# Patient Record
Sex: FEMALE | Race: WHITE | Employment: FULL TIME | URBAN - METROPOLITAN AREA
[De-identification: names, ages, dates, MRNs, and addresses within clinical notes are randomized per-mention and may not be internally consistent; named-entity substitution may affect disease eponyms.]

---

## 2018-06-14 ENCOUNTER — APPOINTMENT (OUTPATIENT)
Dept: ULTRASOUND IMAGING | Age: 28
End: 2018-06-14
Payer: COMMERCIAL

## 2018-06-14 ENCOUNTER — APPOINTMENT (OUTPATIENT)
Dept: CT IMAGING | Age: 28
End: 2018-06-14
Payer: COMMERCIAL

## 2018-06-14 ENCOUNTER — HOSPITAL ENCOUNTER (EMERGENCY)
Age: 28
Discharge: HOME | End: 2018-06-14
Attending: EMERGENCY MEDICINE
Payer: COMMERCIAL

## 2018-06-14 VITALS
RESPIRATION RATE: 18 BRPM | SYSTOLIC BLOOD PRESSURE: 104 MMHG | OXYGEN SATURATION: 99 % | TEMPERATURE: 98.4 F | HEIGHT: 64 IN | BODY MASS INDEX: 28.34 KG/M2 | HEART RATE: 77 BPM | DIASTOLIC BLOOD PRESSURE: 59 MMHG | WEIGHT: 166 LBS

## 2018-06-14 DIAGNOSIS — R10.11 RIGHT UPPER QUADRANT ABDOMINAL PAIN: Primary | ICD-10-CM

## 2018-06-14 LAB
ALBUMIN SERPL-MCNC: 4.2 G/DL (ref 3.5–5.2)
ALP BLD-CCNC: 57 U/L (ref 35–104)
ALT SERPL-CCNC: 14 U/L (ref 0–32)
ANION GAP SERPL CALCULATED.3IONS-SCNC: 13 MMOL/L (ref 7–16)
AST SERPL-CCNC: 19 U/L (ref 0–31)
BACTERIA: NORMAL /HPF
BASOPHILS ABSOLUTE: 0.07 E9/L (ref 0–0.2)
BASOPHILS RELATIVE PERCENT: 0.4 % (ref 0–2)
BILIRUB SERPL-MCNC: 0.4 MG/DL (ref 0–1.2)
BILIRUBIN URINE: NEGATIVE
BLOOD, URINE: ABNORMAL
BUN BLDV-MCNC: 8 MG/DL (ref 6–20)
CALCIUM SERPL-MCNC: 8.9 MG/DL (ref 8.6–10.2)
CHLORIDE BLD-SCNC: 101 MMOL/L (ref 98–107)
CHP ED QC CHECK: YES
CLARITY: CLEAR
CO2: 22 MMOL/L (ref 22–29)
COLOR: YELLOW
CREAT SERPL-MCNC: 0.8 MG/DL (ref 0.5–1)
EOSINOPHILS ABSOLUTE: 0.13 E9/L (ref 0.05–0.5)
EOSINOPHILS RELATIVE PERCENT: 0.8 % (ref 0–6)
GFR AFRICAN AMERICAN: >60
GFR NON-AFRICAN AMERICAN: >60 ML/MIN/1.73
GLUCOSE BLD-MCNC: 101 MG/DL (ref 74–109)
GLUCOSE URINE: NEGATIVE MG/DL
HCG(URINE) PREGNANCY TEST: NEGATIVE
HCT VFR BLD CALC: 40.9 % (ref 34–48)
HEMOGLOBIN: 14.4 G/DL (ref 11.5–15.5)
IMMATURE GRANULOCYTES #: 0.06 E9/L
IMMATURE GRANULOCYTES %: 0.4 % (ref 0–5)
KETONES, URINE: NEGATIVE MG/DL
LACTIC ACID: 0.9 MMOL/L (ref 0.5–2.2)
LEUKOCYTE ESTERASE, URINE: NEGATIVE
LYMPHOCYTES ABSOLUTE: 2.29 E9/L (ref 1.5–4)
LYMPHOCYTES RELATIVE PERCENT: 14.2 % (ref 20–42)
MCH RBC QN AUTO: 33 PG (ref 26–35)
MCHC RBC AUTO-ENTMCNC: 35.2 % (ref 32–34.5)
MCV RBC AUTO: 93.6 FL (ref 80–99.9)
MONOCYTES ABSOLUTE: 0.67 E9/L (ref 0.1–0.95)
MONOCYTES RELATIVE PERCENT: 4.2 % (ref 2–12)
NEUTROPHILS ABSOLUTE: 12.88 E9/L (ref 1.8–7.3)
NEUTROPHILS RELATIVE PERCENT: 80 % (ref 43–80)
NITRITE, URINE: NEGATIVE
PDW BLD-RTO: 11.9 FL (ref 11.5–15)
PH UA: 7 (ref 5–9)
PLATELET # BLD: 251 E9/L (ref 130–450)
PMV BLD AUTO: 9.9 FL (ref 7–12)
POTASSIUM SERPL-SCNC: 3.8 MMOL/L (ref 3.5–5)
PREGNANCY TEST URINE, POC: NEGATIVE
PROTEIN UA: NEGATIVE MG/DL
RBC # BLD: 4.37 E12/L (ref 3.5–5.5)
RBC UA: >20 /HPF (ref 0–2)
SODIUM BLD-SCNC: 136 MMOL/L (ref 132–146)
SPECIFIC GRAVITY UA: 1.01 (ref 1–1.03)
TOTAL PROTEIN: 7.1 G/DL (ref 6.4–8.3)
UROBILINOGEN, URINE: 0.2 E.U./DL
WBC # BLD: 16.1 E9/L (ref 4.5–11.5)
WBC UA: NORMAL /HPF (ref 0–5)

## 2018-06-14 PROCEDURE — 85025 COMPLETE CBC W/AUTO DIFF WBC: CPT

## 2018-06-14 PROCEDURE — 2580000003 HC RX 258: Performed by: PHYSICIAN ASSISTANT

## 2018-06-14 PROCEDURE — 76856 US EXAM PELVIC COMPLETE: CPT

## 2018-06-14 PROCEDURE — 74176 CT ABD & PELVIS W/O CONTRAST: CPT

## 2018-06-14 PROCEDURE — 81001 URINALYSIS AUTO W/SCOPE: CPT

## 2018-06-14 PROCEDURE — 81025 URINE PREGNANCY TEST: CPT

## 2018-06-14 PROCEDURE — 76705 ECHO EXAM OF ABDOMEN: CPT

## 2018-06-14 PROCEDURE — 93976 VASCULAR STUDY: CPT

## 2018-06-14 PROCEDURE — 83605 ASSAY OF LACTIC ACID: CPT

## 2018-06-14 PROCEDURE — 87088 URINE BACTERIA CULTURE: CPT

## 2018-06-14 PROCEDURE — 80053 COMPREHEN METABOLIC PANEL: CPT

## 2018-06-14 PROCEDURE — 99284 EMERGENCY DEPT VISIT MOD MDM: CPT

## 2018-06-14 RX ORDER — 0.9 % SODIUM CHLORIDE 0.9 %
1000 INTRAVENOUS SOLUTION INTRAVENOUS ONCE
Status: COMPLETED | OUTPATIENT
Start: 2018-06-14 | End: 2018-06-14

## 2018-06-14 RX ORDER — ONDANSETRON 4 MG/1
4 TABLET, ORALLY DISINTEGRATING ORAL EVERY 8 HOURS PRN
Qty: 24 TABLET | Refills: 0 | Status: SHIPPED | OUTPATIENT
Start: 2018-06-14 | End: 2019-10-28 | Stop reason: ALTCHOICE

## 2018-06-14 RX ORDER — TRAMADOL HYDROCHLORIDE 50 MG/1
50 TABLET ORAL EVERY 6 HOURS PRN
Qty: 20 TABLET | Refills: 0 | Status: SHIPPED | OUTPATIENT
Start: 2018-06-14 | End: 2018-06-19

## 2018-06-14 RX ADMIN — SODIUM CHLORIDE 1000 ML: 9 INJECTION, SOLUTION INTRAVENOUS at 12:43

## 2018-06-14 ASSESSMENT — PAIN SCALES - GENERAL: PAINLEVEL_OUTOF10: 8

## 2018-06-14 ASSESSMENT — PAIN DESCRIPTION - ORIENTATION: ORIENTATION: RIGHT

## 2018-06-14 ASSESSMENT — PAIN DESCRIPTION - PAIN TYPE: TYPE: ACUTE PAIN

## 2018-06-14 ASSESSMENT — PAIN DESCRIPTION - LOCATION: LOCATION: ABDOMEN;FLANK

## 2018-06-14 ASSESSMENT — PAIN DESCRIPTION - FREQUENCY: FREQUENCY: CONTINUOUS

## 2018-06-14 ASSESSMENT — PAIN DESCRIPTION - DESCRIPTORS: DESCRIPTORS: SHARP;BURNING

## 2018-06-14 ASSESSMENT — PAIN DESCRIPTION - ONSET: ONSET: SUDDEN

## 2018-06-16 LAB — URINE CULTURE, ROUTINE: NORMAL

## 2019-10-28 ENCOUNTER — OFFICE VISIT (OUTPATIENT)
Dept: FAMILY MEDICINE CLINIC | Age: 29
End: 2019-10-28

## 2019-10-28 VITALS
HEART RATE: 96 BPM | OXYGEN SATURATION: 98 % | BODY MASS INDEX: 30.32 KG/M2 | TEMPERATURE: 97.9 F | SYSTOLIC BLOOD PRESSURE: 118 MMHG | WEIGHT: 182 LBS | HEIGHT: 65 IN | DIASTOLIC BLOOD PRESSURE: 80 MMHG

## 2019-10-28 DIAGNOSIS — J20.9 ACUTE BRONCHITIS, UNSPECIFIED ORGANISM: ICD-10-CM

## 2019-10-28 DIAGNOSIS — H92.02 OTALGIA OF LEFT EAR: Primary | ICD-10-CM

## 2019-10-28 PROCEDURE — 99213 OFFICE O/P EST LOW 20 MIN: CPT | Performed by: PHYSICIAN ASSISTANT

## 2019-10-28 RX ORDER — CEFDINIR 300 MG/1
300 CAPSULE ORAL 2 TIMES DAILY
Qty: 20 CAPSULE | Refills: 0 | Status: SHIPPED | OUTPATIENT
Start: 2019-10-28 | End: 2019-11-07

## 2019-10-28 RX ORDER — NORETHINDRONE ACETATE AND ETHINYL ESTRADIOL AND FERROUS FUMARATE 1.5-30(21)
KIT ORAL
Refills: 12 | COMMUNITY
Start: 2019-10-09

## 2019-10-28 RX ORDER — METHYLPREDNISOLONE 4 MG/1
TABLET ORAL
Qty: 1 KIT | Refills: 0 | Status: SHIPPED
Start: 2019-10-28 | End: 2021-03-10

## 2021-02-09 ENCOUNTER — OFFICE VISIT (OUTPATIENT)
Dept: PRIMARY CARE CLINIC | Age: 31
End: 2021-02-09
Payer: MEDICAID

## 2021-02-09 VITALS
WEIGHT: 176 LBS | OXYGEN SATURATION: 95 % | HEART RATE: 86 BPM | RESPIRATION RATE: 16 BRPM | SYSTOLIC BLOOD PRESSURE: 118 MMHG | TEMPERATURE: 98.4 F | DIASTOLIC BLOOD PRESSURE: 74 MMHG | BODY MASS INDEX: 29.32 KG/M2 | HEIGHT: 65 IN

## 2021-02-09 DIAGNOSIS — R42 DIZZINESS: ICD-10-CM

## 2021-02-09 DIAGNOSIS — F17.200 TOBACCO DEPENDENCE: Primary | ICD-10-CM

## 2021-02-09 LAB
ALBUMIN SERPL-MCNC: 4.4 G/DL (ref 3.5–5.2)
ALP BLD-CCNC: 50 U/L (ref 35–104)
ALT SERPL-CCNC: 16 U/L (ref 0–32)
ANION GAP SERPL CALCULATED.3IONS-SCNC: 8 MMOL/L (ref 7–16)
AST SERPL-CCNC: 18 U/L (ref 0–31)
BASOPHILS ABSOLUTE: 0.04 E9/L (ref 0–0.2)
BASOPHILS RELATIVE PERCENT: 0.4 % (ref 0–2)
BILIRUB SERPL-MCNC: 0.4 MG/DL (ref 0–1.2)
BUN BLDV-MCNC: 10 MG/DL (ref 6–20)
CALCIUM SERPL-MCNC: 9 MG/DL (ref 8.6–10.2)
CHLORIDE BLD-SCNC: 104 MMOL/L (ref 98–107)
CO2: 24 MMOL/L (ref 22–29)
CREAT SERPL-MCNC: 0.8 MG/DL (ref 0.5–1)
EOSINOPHILS ABSOLUTE: 0.12 E9/L (ref 0.05–0.5)
EOSINOPHILS RELATIVE PERCENT: 1.1 % (ref 0–6)
GFR AFRICAN AMERICAN: >60
GFR NON-AFRICAN AMERICAN: >60 ML/MIN/1.73
GLUCOSE BLD-MCNC: 93 MG/DL (ref 74–99)
HCT VFR BLD CALC: 45.4 % (ref 34–48)
HEMOGLOBIN: 14.9 G/DL (ref 11.5–15.5)
IMMATURE GRANULOCYTES #: 0.04 E9/L
IMMATURE GRANULOCYTES %: 0.4 % (ref 0–5)
LYMPHOCYTES ABSOLUTE: 1.64 E9/L (ref 1.5–4)
LYMPHOCYTES RELATIVE PERCENT: 14.8 % (ref 20–42)
MCH RBC QN AUTO: 33.1 PG (ref 26–35)
MCHC RBC AUTO-ENTMCNC: 32.8 % (ref 32–34.5)
MCV RBC AUTO: 100.9 FL (ref 80–99.9)
MONOCYTES ABSOLUTE: 0.48 E9/L (ref 0.1–0.95)
MONOCYTES RELATIVE PERCENT: 4.3 % (ref 2–12)
NEUTROPHILS ABSOLUTE: 8.77 E9/L (ref 1.8–7.3)
NEUTROPHILS RELATIVE PERCENT: 79 % (ref 43–80)
PDW BLD-RTO: 11.9 FL (ref 11.5–15)
PLATELET # BLD: 277 E9/L (ref 130–450)
PMV BLD AUTO: 10.7 FL (ref 7–12)
POTASSIUM SERPL-SCNC: 4.2 MMOL/L (ref 3.5–5)
RBC # BLD: 4.5 E12/L (ref 3.5–5.5)
SODIUM BLD-SCNC: 136 MMOL/L (ref 132–146)
TOTAL PROTEIN: 7.1 G/DL (ref 6.4–8.3)
WBC # BLD: 11.1 E9/L (ref 4.5–11.5)

## 2021-02-09 PROCEDURE — 4004F PT TOBACCO SCREEN RCVD TLK: CPT | Performed by: FAMILY MEDICINE

## 2021-02-09 PROCEDURE — G8419 CALC BMI OUT NRM PARAM NOF/U: HCPCS | Performed by: FAMILY MEDICINE

## 2021-02-09 PROCEDURE — G8427 DOCREV CUR MEDS BY ELIG CLIN: HCPCS | Performed by: FAMILY MEDICINE

## 2021-02-09 PROCEDURE — 99203 OFFICE O/P NEW LOW 30 MIN: CPT | Performed by: FAMILY MEDICINE

## 2021-02-09 PROCEDURE — G8484 FLU IMMUNIZE NO ADMIN: HCPCS | Performed by: FAMILY MEDICINE

## 2021-02-09 RX ORDER — PHENOL 1.4 %
AEROSOL, SPRAY (ML) MUCOUS MEMBRANE
COMMUNITY

## 2021-02-09 RX ORDER — BUPROPION HYDROCHLORIDE 150 MG/1
150 TABLET, EXTENDED RELEASE ORAL 2 TIMES DAILY
Qty: 60 TABLET | Refills: 3 | Status: SHIPPED
Start: 2021-02-09 | End: 2021-03-10 | Stop reason: ALTCHOICE

## 2021-02-09 SDOH — ECONOMIC STABILITY: FOOD INSECURITY: WITHIN THE PAST 12 MONTHS, THE FOOD YOU BOUGHT JUST DIDN'T LAST AND YOU DIDN'T HAVE MONEY TO GET MORE.: NEVER TRUE

## 2021-02-09 SDOH — ECONOMIC STABILITY: INCOME INSECURITY: HOW HARD IS IT FOR YOU TO PAY FOR THE VERY BASICS LIKE FOOD, HOUSING, MEDICAL CARE, AND HEATING?: NOT HARD AT ALL

## 2021-02-09 SDOH — ECONOMIC STABILITY: TRANSPORTATION INSECURITY
IN THE PAST 12 MONTHS, HAS THE LACK OF TRANSPORTATION KEPT YOU FROM MEDICAL APPOINTMENTS OR FROM GETTING MEDICATIONS?: NOT ASKED

## 2021-02-09 SDOH — ECONOMIC STABILITY: TRANSPORTATION INSECURITY
IN THE PAST 12 MONTHS, HAS LACK OF TRANSPORTATION KEPT YOU FROM MEETINGS, WORK, OR FROM GETTING THINGS NEEDED FOR DAILY LIVING?: NOT ASKED

## 2021-02-09 ASSESSMENT — PATIENT HEALTH QUESTIONNAIRE - PHQ9
2. FEELING DOWN, DEPRESSED OR HOPELESS: 0
1. LITTLE INTEREST OR PLEASURE IN DOING THINGS: 0
DEPRESSION UNABLE TO ASSESS: FUNCTIONAL CAPACITY MOTIVATION LIMITS ACCURACY
SUM OF ALL RESPONSES TO PHQ9 QUESTIONS 1 & 2: 0
SUM OF ALL RESPONSES TO PHQ QUESTIONS 1-9: 0

## 2021-02-09 NOTE — PROGRESS NOTES
2021     Marcos Spain    : 1990 Sex: female   Age: 33 y.o. Chief Complaint   Patient presents with    New Patient       HPI: This 33y.o. -year-old female  presents today to establish care as a new patient. Past medical history includes polycystic ovarian syndrome. Social history is positive for tobacco dependence. The patient is willing to initiate a tobacco cessation program.  Family history is significant for hypertension and diabetes. Her father  at the age of 62 from a drug overdose. . Current medication list reviewed. The patient is tolerating all medications well without adverse events or known side effects. The patient does understand the risk and benefits of the prescribed medications. The patient is not up-to-date on all age-appropriate wellness issues. The patient presents today with a complaint of dizziness. The patient states she recently changed her diet and started exercising. The patient is only consuming 1200 rashard a day. ROS:   Const: Denies changes in appetite, chills, fever, night sweats and weight loss. Eyes:  Denies discharge, a recent change in visual acuity, blurred vision and double vision. ENMT: Denies discharge of the ears, hearing loss, pain of the ears. Denies nasal or sinus symptoms other than stated above. Denies mouth or throat symptoms. CV:  Denies chest pain, dyspnea on exertion, orthopnea, palpitations and PND  Resp: Denies chest pain, cough, SOB and wheezing. GI: Denies abdominal pain, constipation, diarrhea, heartburn, indigestion, nausea and vomiting. : Denies dysuria, frequency, hematuria, nocturia and urgency. Musculo: Denies arthralgias and myalgia  Skin:  Denies lesions, pruritus and rash. Neuro: Denies dizziness, lightheadedness, numbness, tingling and weakness.   Psych:  Denies anxiety and depression Endocrine: Denies polyuria, polydipsia, polyphagia, weight gain, dry skin, constipation, fatigue, cold intolerance, heat intolerance or tremors. Hema/Lymph: Denies hematologic symptoms  Allergy/Immuno:  Denies allergic/immunologic symptoms. Pertinent positives reviewed and noted      Current Outpatient Medications:     Multiple Vitamins-Minerals (MULTIVITAMIN WOMEN) TABS, Take by mouth, Disp: , Rfl:     buPROPion (WELLBUTRIN SR) 150 MG extended release tablet, Take 1 tablet by mouth 2 times daily, Disp: 60 tablet, Rfl: 3    JUNEL FE 1.5/30 1.5-30 MG-MCG tablet, TAKE ONE TABLET BY MOUTH  PER DAY, Disp: , Rfl: 12    methylPREDNISolone (MEDROL DOSEPACK) 4 MG tablet, Take by mouth.  (Patient not taking: Reported on 2/9/2021), Disp: 1 kit, Rfl: 0    Allergies   Allergen Reactions    Codeine Hives       Past Medical History:   Diagnosis Date    Asthma     PCOS (polycystic ovarian syndrome)      Social History     Socioeconomic History    Marital status: Single     Spouse name: Not on file    Number of children: Not on file    Years of education: Not on file    Highest education level: Not on file   Occupational History    Not on file   Social Needs    Financial resource strain: Not hard at all    Food insecurity     Worry: Never true     Inability: Never true   [employer] needs     Medical: Not on file     Non-medical: Not on file   Tobacco Use    Smoking status: Current Some Day Smoker     Types: Cigarettes    Smokeless tobacco: Never Used   Substance and Sexual Activity    Alcohol use: No    Drug use: No    Sexual activity: Not on file   Lifestyle    Physical activity     Days per week: Not on file     Minutes per session: Not on file    Stress: Not on file   Relationships    Social connections     Talks on phone: Not on file     Gets together: Not on file     Attends Religion service: Not on file     Active member of club or organization: Not on file Attends meetings of clubs or organizations: Not on file     Relationship status: Not on file    Intimate partner violence     Fear of current or ex partner: Not on file     Emotionally abused: Not on file     Physically abused: Not on file     Forced sexual activity: Not on file   Other Topics Concern    Not on file   Social History Narrative    Not on file     Past Surgical History:   Procedure Laterality Date    [facility]      right      History reviewed. No pertinent family history. Vitals:    02/09/21 0748   BP: 118/74   Pulse: 86   Resp: 16   Temp: 98.4 °F (36.9 °C)   TempSrc: Temporal   SpO2: 95%   Weight: 176 lb (79.8 kg)   Height: 5' 5\" (1.651 m)        Exam: Const: Appears healthy and well developed. No signs of acute distress present. Eyes: PERRL  ENMT: Tympanic membranes are intact. Nasal mucosa intact without noted erythema Septum is in the midline. Posterior pharynx shows no exudate, irritation or redness. Neck:  Supple without adenopathy. Adequate range of motion   Resp: No rales, rhonchi, wheezes appreciated over the lungs bilaterally. CV: S1, S2 within normal limits. Regular rate and rhythm noted. Without murmur, gallop or rub. Extremities:  Pulses intact. Without noted edema. Abdomen: Positive bowel sounds. Palpation reveals softness, with no distension, organomegaly or tenderness. No abdominal masses palpable. Skin: Skin is warm and dry. Musculo: Unremarkable upon examination. Neuro: Alert and oriented X3. Cranial nerves grossly intact. Psych: Mood is normal.  Affect is normal.   Vital signs reviewed. Controlled Substances Monitoring:     No flowsheet data found. Plan Per Assessment:  Daphnie Rojas was seen today for new patient. Diagnoses and all orders for this visit:    Tobacco dependence    Dizziness  -     CBC Auto Differential; Future  -     Comprehensive Metabolic Panel;  Future    Other orders -     buPROPion (WELLBUTRIN SR) 150 MG extended release tablet; Take 1 tablet by mouth 2 times daily      The patient was advised to increase her caloric intake to 1800 rashard a day. Return in about 4 weeks (around 3/9/2021) for Erin Guillen. Johnna Moon MD    Note was generated with the assistance of voice recognition software. Document was reviewed however may contain grammatical errors.

## 2021-03-10 ENCOUNTER — OFFICE VISIT (OUTPATIENT)
Dept: PRIMARY CARE CLINIC | Age: 31
End: 2021-03-10
Payer: MEDICAID

## 2021-03-10 VITALS
BODY MASS INDEX: 28.66 KG/M2 | DIASTOLIC BLOOD PRESSURE: 60 MMHG | RESPIRATION RATE: 16 BRPM | WEIGHT: 172 LBS | SYSTOLIC BLOOD PRESSURE: 126 MMHG | HEIGHT: 65 IN

## 2021-03-10 DIAGNOSIS — F17.200 TOBACCO DEPENDENCE: Primary | ICD-10-CM

## 2021-03-10 PROCEDURE — G8419 CALC BMI OUT NRM PARAM NOF/U: HCPCS | Performed by: FAMILY MEDICINE

## 2021-03-10 PROCEDURE — G8427 DOCREV CUR MEDS BY ELIG CLIN: HCPCS | Performed by: FAMILY MEDICINE

## 2021-03-10 PROCEDURE — G8484 FLU IMMUNIZE NO ADMIN: HCPCS | Performed by: FAMILY MEDICINE

## 2021-03-10 PROCEDURE — 4004F PT TOBACCO SCREEN RCVD TLK: CPT | Performed by: FAMILY MEDICINE

## 2021-03-10 PROCEDURE — 99213 OFFICE O/P EST LOW 20 MIN: CPT | Performed by: FAMILY MEDICINE

## 2021-03-10 RX ORDER — VARENICLINE TARTRATE
KIT
Qty: 1 BOX | Refills: 0 | Status: SHIPPED
Start: 2021-03-10 | End: 2021-06-01

## 2021-03-10 NOTE — PROGRESS NOTES
3/10/2021     Flori Spain    : 1990 Sex: female   Age: 33 y.o. Chief Complaint   Patient presents with    Other     Tobacco cessation. HPI: This 33y.o. -year-old female  presents today for evaluation and management of her  chronic medical problems. At her last office visit the patient was started on Wellbutrin for tobacco cessation. Current medication list reviewed the patient states she stopped the Wellbutrin after about 1 weeks worth of therapy due to a side effect of nausea. The patient states she has cut back on the number of cigarettes smoked daily. . The patient is not up-to-date on all age-appropriate wellness issues. ROS:   Const: Denies changes in appetite, chills, fever, night sweats and weight loss. Eyes:  Denies discharge, a recent change in visual acuity, blurred vision and double vision. ENMT: Denies discharge of the ears, hearing loss, pain of the ears. Denies nasal or sinus symptoms other than stated above. Denies mouth or throat symptoms. CV:  Denies chest pain, dyspnea on exertion, orthopnea, palpitations and PND  Resp: Denies chest pain, cough, SOB and wheezing. GI: Denies abdominal pain, constipation, diarrhea, heartburn, indigestion, nausea and vomiting. : Denies dysuria, frequency, hematuria, nocturia and urgency. Musculo: Denies arthralgias and myalgia  Skin:  Denies lesions, pruritus and rash. Neuro: Denies dizziness, lightheadedness, numbness, tingling and weakness. Psych:  Denies anxiety and depression  Endocrine: Denies polyuria, polydipsia, polyphagia, weight gain, dry skin, constipation, fatigue, cold intolerance, heat intolerance or tremors. Hema/Lymph: Denies hematologic symptoms  Allergy/Immuno:  Denies allergic/immunologic symptoms.   Pertinent positives reviewed and noted      Current Outpatient Medications:     varenicline (CHANTIX STARTING MONTH ) 0.5 MG X 11 & 1 MG X 42 tablet, Take by mouth., Disp: 1 box, Rfl: 0    Multiple Exam: Const: Appears healthy and well developed. No signs of acute distress present. Eyes: PERRL  ENMT: Tympanic membranes are intact. Nasal mucosa intact without noted erythema Septum is in the midline. Posterior pharynx shows no exudate, irritation or redness. Neck:  Supple without adenopathy. Adequate range of motion   Resp: No rales, rhonchi, wheezes appreciated over the lungs bilaterally. CV: S1, S2 within normal limits. Regular rate and rhythm noted. Without murmur, gallop or rub. Extremities:  Pulses intact. Without noted edema. Abdomen: Positive bowel sounds. Palpation reveals softness, with no distension, organomegaly or tenderness. No abdominal masses palpable. Skin: Skin is warm and dry. Musculo: Unremarkable upon examination. Neuro: Alert and oriented X3. Cranial nerves grossly intact. Psych: Mood is normal.  Affect is normal.   Vital signs reviewed. Controlled Substances Monitoring:     No flowsheet data found. Plan Per Assessment:  Cinthia Grace was seen today for other. Diagnoses and all orders for this visit:    Tobacco dependence    Other orders  -     varenicline (CHANTIX STARTING MONTH PAK) 0.5 MG X 11 & 1 MG X 42 tablet; Take by mouth. Return in about 4 weeks (around 4/7/2021) for Erin Guillen. Felix Mckinley MD    Note was generated with the assistance of voice recognition software. Document was reviewed however may contain grammatical errors.

## 2021-06-01 ENCOUNTER — OFFICE VISIT (OUTPATIENT)
Dept: FAMILY MEDICINE CLINIC | Age: 31
End: 2021-06-01
Payer: MEDICAID

## 2021-06-01 VITALS
WEIGHT: 178 LBS | TEMPERATURE: 97.8 F | BODY MASS INDEX: 29.62 KG/M2 | HEART RATE: 91 BPM | SYSTOLIC BLOOD PRESSURE: 122 MMHG | DIASTOLIC BLOOD PRESSURE: 80 MMHG | OXYGEN SATURATION: 97 %

## 2021-06-01 DIAGNOSIS — H66.003 NON-RECURRENT ACUTE SUPPURATIVE OTITIS MEDIA OF BOTH EARS WITHOUT SPONTANEOUS RUPTURE OF TYMPANIC MEMBRANES: Primary | ICD-10-CM

## 2021-06-01 PROCEDURE — G8419 CALC BMI OUT NRM PARAM NOF/U: HCPCS | Performed by: NURSE PRACTITIONER

## 2021-06-01 PROCEDURE — 4004F PT TOBACCO SCREEN RCVD TLK: CPT | Performed by: NURSE PRACTITIONER

## 2021-06-01 PROCEDURE — 99213 OFFICE O/P EST LOW 20 MIN: CPT | Performed by: NURSE PRACTITIONER

## 2021-06-01 PROCEDURE — G8427 DOCREV CUR MEDS BY ELIG CLIN: HCPCS | Performed by: NURSE PRACTITIONER

## 2021-06-01 RX ORDER — AMOXICILLIN 875 MG/1
875 TABLET, COATED ORAL 2 TIMES DAILY
Qty: 14 TABLET | Refills: 0 | Status: SHIPPED | OUTPATIENT
Start: 2021-06-01 | End: 2021-06-08

## 2021-06-01 RX ORDER — CETIRIZINE HYDROCHLORIDE, PSEUDOEPHEDRINE HYDROCHLORIDE 5; 120 MG/1; MG/1
1 TABLET, FILM COATED, EXTENDED RELEASE ORAL 2 TIMES DAILY
Qty: 60 TABLET | Refills: 0 | Status: SHIPPED | OUTPATIENT
Start: 2021-06-01

## 2021-06-01 RX ORDER — METHYLPREDNISOLONE 4 MG/1
TABLET ORAL
Qty: 1 KIT | Refills: 0 | Status: SHIPPED | OUTPATIENT
Start: 2021-06-01 | End: 2021-06-07

## 2021-06-01 NOTE — LETTER
[address]  Phone: [phone]  Fax: [phone]    DELANEY Nath NP        June 1, 2021      Yazan Kahn was seen in the clinic today. She may return to work on 6/2/2021. If you have any questions please do not hesitate to call. Thank you.       Sincerely,        EDLANEY Nath NP

## 2021-06-01 NOTE — PROGRESS NOTES
Chief Complaint:   Otalgia (bilateral ) and Sinus Problem (saturday )      History of Present Illness   Source of history provided by:  patient. Aleah Mai is a 33 y.o. old female presenting to the [facility] for evaluation of bilateral ear pain and congestion x 3 days. Reports associated nasal congestion, rhinorrhea, and sore throat. Denies any discharge from the ear canal. Has tried taking sinus medication OTC without relief. Denies any fever, chills, CP, SOB, abdominal pain, neck stiffness, rash, or lethargy. Review of Systems   Unless otherwise stated in this report or unable to obtain because of the patient's clinical or mental status as evidenced by the medical record, this patients's positive and negative responses for Review of Systems, constitutional, psych, eyes, ENT, cardiovascular, respiratory, gastrointestinal, neurological, genitourinary, musculoskeletal, integument systems and systems related to the presenting problem are either stated in the preceding or were not pertinent or were negative for the symptoms and/or complaints related to the medical problem. Past Medical History:  has a past medical history of Asthma and PCOS (polycystic ovarian syndrome). Past Surgical History:  has a past surgical history that includes Leg Surgery and Breast surgery. Social History:  reports that she has been smoking cigarettes. She has never used smokeless tobacco. She reports that she does not drink alcohol and does not use drugs. Family History: family history is not on file. Allergies: Codeine    Physical Exam   Vital Signs:   Vitals:    06/01/21 0828   BP: 122/80   Pulse: 91   Temp: 97.8 °F (36.6 °C)   TempSrc: Temporal   SpO2: 97%   Weight: 178 lb (80.7 kg)     Oxygen Saturation Interpretation: Normal.    Constitutional:  Alert, development consistent with age. Ears:  External Ears: Normal pinna bilaterally.                  TM's & External Canals: Bilateral external auditory ear canals are clear there is no cerumen, debris or erythema present. Bilateral tympanic membranes intact, bulging and erythematous. Nose: Mild congestion of the nasal mucosa. Throat:  Posterior pharynx without injection, exudate, or tonsillar hypertrophy. Airway patient. Neck:  Normal ROM. Supple. No adenopathy. Respiratory:  CTAB without wheezing, rales, or rhonchi  CV: Regular rate and rhythm, normal heart sounds, without pathological murmurs, ectopy, gallops, or rubs. Skin:  Moist and warm without rashes or lesions. Lymphatic: No lymphangitis or adenopathy noted. Neurological:  Oriented. Motor functions intact. Test Results Section   (All laboratory and radiology results have been personally reviewed by myself)  Labs:  No results found for this visit on 06/01/21. Assessment / Plan     Impression(s):  Jaye Christy was seen today for otalgia and sinus problem. Diagnoses and all orders for this visit:    Non-recurrent acute suppurative otitis media of both ears without spontaneous rupture of tympanic membranes  -     amoxicillin (AMOXIL) 875 MG tablet; Take 1 tablet by mouth 2 times daily for 7 days  -     cetirizine-psuedoephedrine (ZYRTEC-D) 5-120 MG per extended release tablet; Take 1 tablet by mouth 2 times daily X 3 days, then as needed  -     methylPREDNISolone (MEDROL DOSEPACK) 4 MG tablet; Take by mouth. Script written for amoxicillin, Zyrtec-D and Medrol Dosepak, administration instructions and side effects discussed. Increase fluids and rest. Additional symptomatic relief discussed. F/u PCP in 5-7 days if symptoms persist. ED sooner if symptoms worsen or change. ED immediately with fever, worsening ear pain, CP, dyspnea, or dysphagia. Pt is in agreement with this care plan. All questions answered. No follow-ups on file.     DELANEY Ambriz - CORY

## 2021-10-13 ENCOUNTER — OFFICE VISIT (OUTPATIENT)
Dept: FAMILY MEDICINE CLINIC | Age: 31
End: 2021-10-13
Payer: MEDICAID

## 2021-10-13 VITALS
DIASTOLIC BLOOD PRESSURE: 82 MMHG | SYSTOLIC BLOOD PRESSURE: 140 MMHG | BODY MASS INDEX: 30.29 KG/M2 | OXYGEN SATURATION: 100 % | WEIGHT: 182 LBS | HEART RATE: 78 BPM | TEMPERATURE: 97.7 F

## 2021-10-13 DIAGNOSIS — J02.0 ACUTE STREPTOCOCCAL PHARYNGITIS: ICD-10-CM

## 2021-10-13 DIAGNOSIS — R07.0 PAIN IN THROAT: Primary | ICD-10-CM

## 2021-10-13 DIAGNOSIS — J02.9 SORE THROAT: ICD-10-CM

## 2021-10-13 LAB — S PYO AG THROAT QL: POSITIVE

## 2021-10-13 PROCEDURE — G8484 FLU IMMUNIZE NO ADMIN: HCPCS | Performed by: PHYSICIAN ASSISTANT

## 2021-10-13 PROCEDURE — G8427 DOCREV CUR MEDS BY ELIG CLIN: HCPCS | Performed by: PHYSICIAN ASSISTANT

## 2021-10-13 PROCEDURE — 87880 STREP A ASSAY W/OPTIC: CPT | Performed by: PHYSICIAN ASSISTANT

## 2021-10-13 PROCEDURE — 4004F PT TOBACCO SCREEN RCVD TLK: CPT | Performed by: PHYSICIAN ASSISTANT

## 2021-10-13 PROCEDURE — 99213 OFFICE O/P EST LOW 20 MIN: CPT | Performed by: PHYSICIAN ASSISTANT

## 2021-10-13 PROCEDURE — G8417 CALC BMI ABV UP PARAM F/U: HCPCS | Performed by: PHYSICIAN ASSISTANT

## 2021-10-13 RX ORDER — AMOXICILLIN AND CLAVULANATE POTASSIUM 875; 125 MG/1; MG/1
1 TABLET, FILM COATED ORAL 2 TIMES DAILY
Qty: 20 TABLET | Refills: 0 | Status: SHIPPED | OUTPATIENT
Start: 2021-10-13 | End: 2021-10-23

## 2021-10-13 ASSESSMENT — ENCOUNTER SYMPTOMS
PHOTOPHOBIA: 0
DIARRHEA: 0
COUGH: 0
VOMITING: 0
NAUSEA: 0
SORE THROAT: 1
SHORTNESS OF BREATH: 0
ABDOMINAL PAIN: 0
BACK PAIN: 0

## 2021-10-13 NOTE — LETTER
[address]  Phone: [phone]  Fax: [phone] [facility], [address]        October 13, 2021     Patient: Renée Zhang   YOB: 1990   Date of Visit: 10/13/2021       To Whom it May Concern:    Blanca Hoffman was seen in my clinic on 10/13/2021. She may return to work on 10/15/2021. If you have any questions or concerns, please don't hesitate to call.     Sincerely,         MORGAN GUTHRIE

## 2021-10-13 NOTE — PROGRESS NOTES
10/13/21  Caty Spain : 1990 Sex: female  Age 33 y.o. Subjective:  Chief Complaint   Patient presents with    Otalgia     bilateral     Pharyngitis         27-year-old female with negative history presents to the walk in clinic for evaluation of earache that started Saturday and a sore throat that started Monday night. She denies any associated cough or congestion. No shortness of breath or chest pain. No fever or chills. No nausea or vomiting. She is not taking any over-the-counter medications. She is currently on her menses and denies chance of pregnancy. She denies any difficulty swallowing. Review of Systems   Constitutional: Negative for chills and fever. HENT: Positive for ear pain and sore throat. Negative for congestion. Eyes: Negative for photophobia and visual disturbance. Respiratory: Negative for cough and shortness of breath. Cardiovascular: Negative for chest pain. Gastrointestinal: Negative for abdominal pain, diarrhea, nausea and vomiting. Genitourinary: Negative for difficulty urinating, dysuria, frequency and urgency. Musculoskeletal: Negative for back pain, neck pain and neck stiffness. Skin: Negative for rash. Neurological: Negative for dizziness, syncope, weakness, light-headedness and headaches. Hematological: Negative for adenopathy. Does not bruise/bleed easily. Psychiatric/Behavioral: Negative for agitation and confusion. All other systems reviewed and are negative. PMH:     Past Medical History:   Diagnosis Date    Asthma     PCOS (polycystic ovarian syndrome)        Past Surgical History:   Procedure Laterality Date    BREAST SURGERY      LEG SURGERY      right       History reviewed. No pertinent family history.     Medications:     Current Outpatient Medications:     amoxicillin-clavulanate (AUGMENTIN) 875-125 MG per tablet, Take 1 tablet by mouth 2 times daily for 10 days, Disp: 20 tablet, Rfl: 0   cetirizine-psuedoephedrine (ZYRTEC-D) 5-120 MG per extended release tablet, Take 1 tablet by mouth 2 times daily X 3 days, then as needed, Disp: 60 tablet, Rfl: 0    Multiple Vitamins-Minerals (MULTIVITAMIN WOMEN) TABS, Take by mouth, Disp: , Rfl:     JUNEL FE 1.5/30 1.5-30 MG-MCG tablet, TAKE ONE TABLET BY MOUTH  PER DAY, Disp: , Rfl: 12    Allergies: Allergies   Allergen Reactions    Codeine Hives       Social History:     Social History     Tobacco Use    Smoking status: Current Some Day Smoker     Types: Cigarettes    Smokeless tobacco: Never Used   Substance Use Topics    Alcohol use: No    Drug use: No       Patient lives at home. Physical Exam:     Vitals:    10/13/21 1542 10/13/21 1545   BP: (!) 140/82 (!) 140/82   Pulse: 78    Temp: 97.7 °F (36.5 °C)    TempSrc: Temporal    SpO2: 100%    Weight: 182 lb (82.6 kg)        Exam:  Physical Exam  Vitals and nursing note reviewed. Constitutional:       General: She is not in acute distress. Appearance: She is well-developed. HENT:      Head: Normocephalic and atraumatic. Right Ear: Tympanic membrane normal.      Left Ear: Tympanic membrane normal.      Nose: Nose normal.      Mouth/Throat:      Mouth: Mucous membranes are moist.      Comments: Uvula is midline. No trismus or drooling. Patient does have erythema noted to the posterior oropharynx without tonsillar hypertrophy or exudate. No peritonsillar abscess. Eyes:      Conjunctiva/sclera: Conjunctivae normal.      Pupils: Pupils are equal, round, and reactive to light. Cardiovascular:      Rate and Rhythm: Normal rate and regular rhythm. Pulmonary:      Effort: Pulmonary effort is normal. No respiratory distress. Breath sounds: Normal breath sounds. Abdominal:      General: Bowel sounds are normal.      Palpations: Abdomen is soft. Tenderness: There is no abdominal tenderness. Musculoskeletal:         General: Normal range of motion.       Cervical back: Normal range of motion. No rigidity. Lymphadenopathy:      Cervical: No cervical adenopathy. Skin:     General: Skin is warm and dry. Neurological:      General: No focal deficit present. Mental Status: She is alert and oriented to person, place, and time. Psychiatric:         Mood and Affect: Mood normal.         Behavior: Behavior normal.         Thought Content: Thought content normal.         Judgment: Judgment normal.           Testing:           Medical Decision Making:     Patient upon arrival did not appear toxic or lethargic. Vital signs were reviewed. Past medical history reviewed. Allergies reviewed. Medications reviewed. Patient is presenting with the above complaint of sore throat. Rapid strep test is positive. Patient will be given a prescription for Augmentin. She may use Tylenol Motrin as needed for discomfort. She can gargle with warm salt water. She is to maintain good oral intake. She is to go to the emergency department if she develops any trismus or drooling. Patient understands the plan is agreeable. She will follow up with her PCP as needed. Clinical Impression:   [address] [place] [address],[address] was seen today for otalgia and pharyngitis. Diagnoses and all orders for this visit:    Pain in throat  -     POCT rapid strep A    Sore throat    Acute streptococcal pharyngitis    Other orders  -     amoxicillin-clavulanate (AUGMENTIN) 875-125 MG per tablet; Take 1 tablet by mouth 2 times daily for 10 days        The patient is to call for any concerns or return if any of the signs or symptoms worsen. The patient is to follow-up with PCP in the next 2-3 days for repeat evaluation repeat assessment or go directly to the emergency department. SIGNATURE: Jacqueline Zurita PA-C

## 2022-01-20 ENCOUNTER — OFFICE VISIT (OUTPATIENT)
Dept: FAMILY MEDICINE CLINIC | Age: 32
End: 2022-01-20
Payer: MEDICAID

## 2022-01-20 VITALS
WEIGHT: 182 LBS | HEART RATE: 98 BPM | SYSTOLIC BLOOD PRESSURE: 136 MMHG | RESPIRATION RATE: 18 BRPM | BODY MASS INDEX: 30.32 KG/M2 | HEIGHT: 65 IN | TEMPERATURE: 97.8 F | OXYGEN SATURATION: 100 % | DIASTOLIC BLOOD PRESSURE: 80 MMHG

## 2022-01-20 DIAGNOSIS — Z20.822 ENCOUNTER FOR LABORATORY TESTING FOR COVID-19 VIRUS: ICD-10-CM

## 2022-01-20 DIAGNOSIS — J01.40 ACUTE NON-RECURRENT PANSINUSITIS: Primary | ICD-10-CM

## 2022-01-20 LAB
INFLUENZA A ANTIGEN, POC: NORMAL
INFLUENZA B ANTIGEN, POC: NORMAL
Lab: NORMAL
PERFORMING INSTRUMENT: NORMAL
QC PASS/FAIL: NORMAL
SARS-COV-2, POC: NORMAL

## 2022-01-20 PROCEDURE — 99213 OFFICE O/P EST LOW 20 MIN: CPT | Performed by: PHYSICIAN ASSISTANT

## 2022-01-20 PROCEDURE — 87426 SARSCOV CORONAVIRUS AG IA: CPT | Performed by: PHYSICIAN ASSISTANT

## 2022-01-20 PROCEDURE — G8417 CALC BMI ABV UP PARAM F/U: HCPCS | Performed by: PHYSICIAN ASSISTANT

## 2022-01-20 PROCEDURE — G8427 DOCREV CUR MEDS BY ELIG CLIN: HCPCS | Performed by: PHYSICIAN ASSISTANT

## 2022-01-20 PROCEDURE — 87804 INFLUENZA ASSAY W/OPTIC: CPT | Performed by: PHYSICIAN ASSISTANT

## 2022-01-20 PROCEDURE — G8484 FLU IMMUNIZE NO ADMIN: HCPCS | Performed by: PHYSICIAN ASSISTANT

## 2022-01-20 PROCEDURE — 4004F PT TOBACCO SCREEN RCVD TLK: CPT | Performed by: PHYSICIAN ASSISTANT

## 2022-01-20 RX ORDER — CEFDINIR 300 MG/1
300 CAPSULE ORAL 2 TIMES DAILY
Qty: 20 CAPSULE | Refills: 0 | Status: SHIPPED | OUTPATIENT
Start: 2022-01-20 | End: 2022-01-30

## 2022-01-20 NOTE — PROGRESS NOTES
Chief Complaint       Pharyngitis (x 4 days), Headache (no exposures), Otalgia (L ear), and Fever (high of 101.8)      History of Present Illness   Source of history provided by:  patient. Aleah Mai is a 33 y.o. old female presenting to the walk in clinic for evaluation of sore throat, sinus headache, left ear pain, and nasal congestion for the past 4 to 5 days. Patient does report a fever (high of 101.8) at the start of her illness which has since resolved. Denies any loss of taste or smell, CP, dyspnea, LE edema, abdominal pain, vomiting, rash, or lethargy. Patient does have a history of asthma but has not had to use her rescue inhaler during this acute illness. Patient denies recent sick exposures. ROS    Unless otherwise stated in this report or unable to obtain because of the patient's clinical or mental status as evidenced by the medical record, this patients's positive and negative responses for Review of Systems, constitutional, psych, eyes, ENT, cardiovascular, respiratory, gastrointestinal, neurological, genitourinary, musculoskeletal, integument systems and systems related to the presenting problem are either stated in the preceding or were not pertinent or were negative for the symptoms and/or complaints related to the medical problem. Past Medical History:  has a past medical history of Asthma and PCOS (polycystic ovarian syndrome). Past Surgical History:  has a past surgical history that includes Leg Surgery and Breast surgery. Social History:  reports that she has been smoking cigarettes. She has never used smokeless tobacco. She reports that she does not drink alcohol and does not use drugs. Family History: family history is not on file.    Allergies: Codeine    Physical Exam         VS:  /80   Pulse 98   Temp 97.8 °F (36.6 °C) (Temporal)   Resp 18   Ht 5' 5\" (1.651 m)   Wt 182 lb (82.6 kg)   SpO2 100%   BMI 30.29 kg/m²    Oxygen Saturation Interpretation: Normal.    Constitutional:  Alert, development consistent with age. NAD. Head:  NC/NT. Airway patent. Moderate TTP noted over the bilateral ethmoid and maxillary sinuses. Left TM dull with small serous effusion noted. Right TM within normal limits. Canals without swelling or exudate bilaterally. Mouth: Posterior pharynx with mild erythema and clear postnasal drip. No tonsillar hypertrophy or exudate. Neck:  Normal ROM. Supple. No anterior cervical adenopathy noted. Lungs: CTAB without wheezes, rales, or rhonchi. CV:  Regular rate and rhythm, normal heart sounds, without pathological murmurs, ectopy, gallops, or rubs. Skin:  Normal turgor. Warm, dry, without visible rash. Lymphatic: No lymphangitis or adenopathy noted. Neurological:  Oriented. Motor functions intact. Lab / Imaging Results   (All laboratory and radiology results have been personally reviewed by myself)  Labs:  Results for orders placed or performed in visit on 01/20/22   POCT COVID-19, Antigen   Result Value Ref Range    SARS-COV-2, POC Not-Detected Not Detected    Lot Number [id number]     QC Pass/Fail pass     Performing Instrument BD Veritor    POCT Influenza A/B Antigen   Result Value Ref Range    Inflenza A Ag neg     Influenza B Ag neg        Imaging: All Radiology results interpreted by Radiologist unless otherwise noted. Assessment / Plan     Impression(s):  María Chavez was seen today for pharyngitis, headache, otalgia and fever. Diagnoses and all orders for this visit:    Acute non-recurrent pansinusitis  -     POCT Influenza A/B Antigen    Encounter for laboratory testing for COVID-19 virus  -     POCT COVID-19, Antigen  -     COVID-19 Ambulatory; Future    Other orders  -     cefdinir (OMNICEF) 300 MG capsule; Take 1 capsule by mouth 2 times daily for 10 days      Disposition:  Disposition: Discharge to home. Rapid COVID-19 and rapid influenza testing are both negative in office.   Confirmatory COVID-19 PCR swab obtained and pending, will call with results once available. Advised cautionary self-quarantine at home in the interim. We will treat for suspected early sinusitis today with a course of Omnicef, side effects discussed. Increase fluids and rest. Symptomatic relief discussed including Tylenol prn pain/fever. Schedule virtual f/u with PCP in 7-10 days if symptoms persist. ED sooner if symptoms worsen or change. ED immediately with high or refractory fever, progressive SOB, dyspnea, CP, calf pain/swelling, shaking chills, vomiting, abdominal pain, lethargy, flank pain, or decreased urinary output. Pt verbalizes understanding and is in agreement with plan of care. All questions answered. Alphonse Knight PA-C    **This report was transcribed using voice recognition software. Every effort was made to ensure accuracy; however, inadvertent computerized transcription errors may be present.

## 2022-01-21 LAB — SARS-COV-2, PCR: NOT DETECTED

## 2022-10-11 ENCOUNTER — OFFICE VISIT (OUTPATIENT)
Dept: FAMILY MEDICINE CLINIC | Age: 32
End: 2022-10-11
Payer: MEDICAID

## 2022-10-11 VITALS
OXYGEN SATURATION: 98 % | HEIGHT: 65 IN | TEMPERATURE: 98 F | WEIGHT: 191 LBS | SYSTOLIC BLOOD PRESSURE: 132 MMHG | DIASTOLIC BLOOD PRESSURE: 86 MMHG | HEART RATE: 92 BPM | BODY MASS INDEX: 31.82 KG/M2 | RESPIRATION RATE: 18 BRPM

## 2022-10-11 DIAGNOSIS — J01.90 ACUTE NON-RECURRENT SINUSITIS, UNSPECIFIED LOCATION: Primary | ICD-10-CM

## 2022-10-11 PROCEDURE — G8417 CALC BMI ABV UP PARAM F/U: HCPCS | Performed by: PHYSICIAN ASSISTANT

## 2022-10-11 PROCEDURE — 99213 OFFICE O/P EST LOW 20 MIN: CPT | Performed by: PHYSICIAN ASSISTANT

## 2022-10-11 PROCEDURE — G8427 DOCREV CUR MEDS BY ELIG CLIN: HCPCS | Performed by: PHYSICIAN ASSISTANT

## 2022-10-11 PROCEDURE — G8484 FLU IMMUNIZE NO ADMIN: HCPCS | Performed by: PHYSICIAN ASSISTANT

## 2022-10-11 PROCEDURE — 4004F PT TOBACCO SCREEN RCVD TLK: CPT | Performed by: PHYSICIAN ASSISTANT

## 2022-10-11 RX ORDER — METHYLPREDNISOLONE 4 MG/1
TABLET ORAL
Qty: 1 KIT | Refills: 0 | Status: SHIPPED | OUTPATIENT
Start: 2022-10-11

## 2022-10-11 RX ORDER — AMOXICILLIN AND CLAVULANATE POTASSIUM 875; 125 MG/1; MG/1
1 TABLET, FILM COATED ORAL 2 TIMES DAILY
Qty: 20 TABLET | Refills: 0 | Status: SHIPPED | OUTPATIENT
Start: 2022-10-11 | End: 2022-10-21

## 2022-10-11 ASSESSMENT — ENCOUNTER SYMPTOMS
VOMITING: 0
SORE THROAT: 0
COUGH: 1
DIARRHEA: 0
NAUSEA: 0
ABDOMINAL PAIN: 0
BACK PAIN: 0
PHOTOPHOBIA: 0
SHORTNESS OF BREATH: 0

## 2022-10-11 NOTE — PROGRESS NOTES
10/11/22  Juan Bradley : 1990 Sex: female  Age 27 y.o. Subjective:  Chief Complaint   Patient presents with    Congestion    Otalgia         61-year-old female presents to the walk-in clinic for evaluation of sinus congestion and right earache. She states her symptoms began last week on Thursday. She is worried that it is getting go into her chest.  She states she has an occasional cough which sounds wet but is nonproductive. She notes sinus congestion and pressure with right ear discomfort. She is using over-the-counter allergy cough medicine as well as Advil Cold and Sinus without relief. She denies any known sick contacts. She denies fever, chills, nausea or vomiting. No shortness of breath or chest pain. She denies chance of pregnancy. She has had an IUD since 2022. Review of Systems   Constitutional:  Negative for chills and fever. HENT:  Positive for congestion and ear pain. Negative for sore throat. Eyes:  Negative for photophobia and visual disturbance. Respiratory:  Positive for cough. Negative for shortness of breath. Cardiovascular:  Negative for chest pain. Gastrointestinal:  Negative for abdominal pain, diarrhea, nausea and vomiting. Genitourinary:  Negative for difficulty urinating, dysuria, frequency and urgency. Musculoskeletal:  Negative for back pain, neck pain and neck stiffness. Skin:  Negative for rash. Neurological:  Negative for dizziness, syncope, weakness, light-headedness and headaches. Hematological:  Negative for adenopathy. Does not bruise/bleed easily. Psychiatric/Behavioral:  Negative for agitation and confusion. All other systems reviewed and are negative. PMH:     Past Medical History:   Diagnosis Date    Asthma     PCOS (polycystic ovarian syndrome)        Past Surgical History:   Procedure Laterality Date    BREAST SURGERY      LEG SURGERY      right       History reviewed.  No pertinent family history. Medications:     Current Outpatient Medications:     amoxicillin-clavulanate (AUGMENTIN) 875-125 MG per tablet, Take 1 tablet by mouth 2 times daily for 10 days, Disp: 20 tablet, Rfl: 0    methylPREDNISolone (MEDROL DOSEPACK) 4 MG tablet, Take by mouth., Disp: 1 kit, Rfl: 0    cetirizine-psuedoephedrine (ZYRTEC-D) 5-120 MG per extended release tablet, Take 1 tablet by mouth 2 times daily X 3 days, then as needed, Disp: 60 tablet, Rfl: 0    Multiple Vitamins-Minerals (MULTIVITAMIN WOMEN) TABS, Take by mouth, Disp: , Rfl:     JUNEL FE 1.5/30 1.5-30 MG-MCG tablet, TAKE ONE TABLET BY MOUTH  PER DAY, Disp: , Rfl: 12    Allergies: Allergies   Allergen Reactions    Codeine Hives       Social History:     Social History     Tobacco Use    Smoking status: Some Days     Types: Cigarettes    Smokeless tobacco: Never   Substance Use Topics    Alcohol use: No    Drug use: No       Patient lives at home. Physical Exam:     Vitals:    10/11/22 0813   BP: 132/86   Pulse: 92   Resp: 18   Temp: 98 °F (36.7 °C)   TempSrc: Temporal   SpO2: 98%   Weight: 191 lb (86.6 kg)   Height: 5' 5\" (1.651 m)       Exam:  Physical Exam  Vitals and nursing note reviewed. Constitutional:       General: She is not in acute distress. Appearance: She is well-developed. HENT:      Head: Normocephalic and atraumatic. Right Ear: Tympanic membrane normal.      Left Ear: Tympanic membrane normal.      Nose: Nose normal.      Mouth/Throat:      Mouth: Mucous membranes are moist.      Pharynx: Oropharynx is clear. Eyes:      Conjunctiva/sclera: Conjunctivae normal.      Pupils: Pupils are equal, round, and reactive to light. Cardiovascular:      Rate and Rhythm: Normal rate and regular rhythm. Pulmonary:      Effort: Pulmonary effort is normal. No respiratory distress. Breath sounds: Normal breath sounds. Abdominal:      General: Bowel sounds are normal.      Palpations: Abdomen is soft. Tenderness:  There is no abdominal tenderness. Musculoskeletal:         General: Normal range of motion. Cervical back: Normal range of motion. No rigidity. Lymphadenopathy:      Cervical: No cervical adenopathy. Skin:     General: Skin is warm and dry. Neurological:      General: No focal deficit present. Mental Status: She is alert and oriented to person, place, and time. Psychiatric:         Mood and Affect: Mood normal.         Behavior: Behavior normal.         Thought Content: Thought content normal.         Judgment: Judgment normal.         Testing:           Medical Decision Making:     Patient upon arrival did not appear toxic or lethargic. Vital signs were reviewed. Past medical history reviewed. Allergies reviewed. Medications reviewed. Patient is presenting with the above complaint of cough and congestion. Differential diagnosis was discussed with the patient. Patient will be given a prescription for Augmentin and Medrol Dosepak. Patient may use over-the-counter analgesics and decongestants as needed. Patient is continue to monitor symptoms and follow-up with their primary care provider in 3-5 days if no improvement. Patient will return or go to the emergency department for any worsening symptoms. Patient understands the plan is agreeable. Clinical Impression:   Gerardo Palumbo was seen today for congestion and otalgia. Diagnoses and all orders for this visit:    Acute non-recurrent sinusitis, unspecified location    Other orders  -     amoxicillin-clavulanate (AUGMENTIN) 875-125 MG per tablet; Take 1 tablet by mouth 2 times daily for 10 days  -     methylPREDNISolone (MEDROL DOSEPACK) 4 MG tablet; Take by mouth. The patient is to call for any concerns or return if any of the signs or symptoms worsen. The patient is to follow-up with PCP in the next 2-3 days for repeat evaluation repeat assessment or go directly to the emergency department. SIGNATURE: Jacqueline Parker JOHN

## 2023-02-07 ENCOUNTER — OFFICE VISIT (OUTPATIENT)
Dept: FAMILY MEDICINE CLINIC | Age: 33
End: 2023-02-07
Payer: MEDICAID

## 2023-02-07 VITALS
TEMPERATURE: 98.3 F | HEIGHT: 65 IN | DIASTOLIC BLOOD PRESSURE: 86 MMHG | WEIGHT: 191 LBS | RESPIRATION RATE: 18 BRPM | BODY MASS INDEX: 31.82 KG/M2 | SYSTOLIC BLOOD PRESSURE: 134 MMHG | HEART RATE: 102 BPM | OXYGEN SATURATION: 98 %

## 2023-02-07 DIAGNOSIS — R30.0 DYSURIA: Primary | ICD-10-CM

## 2023-02-07 DIAGNOSIS — R30.0 DYSURIA: ICD-10-CM

## 2023-02-07 DIAGNOSIS — R39.9 UTI SYMPTOMS: ICD-10-CM

## 2023-02-07 DIAGNOSIS — R35.0 URINARY FREQUENCY: ICD-10-CM

## 2023-02-07 LAB
BILIRUBIN, POC: NORMAL
BLOOD URINE, POC: NORMAL
CLARITY, POC: NORMAL
COLOR, POC: NORMAL
CONTROL: NORMAL
GLUCOSE URINE, POC: NORMAL
KETONES, POC: NORMAL
LEUKOCYTE EST, POC: NORMAL
NITRITE, POC: NORMAL
PH, POC: 7
PREGNANCY TEST URINE, POC: NORMAL
PROTEIN, POC: NORMAL
SPECIFIC GRAVITY, POC: 1.02
UROBILINOGEN, POC: NORMAL

## 2023-02-07 PROCEDURE — 99214 OFFICE O/P EST MOD 30 MIN: CPT | Performed by: PHYSICIAN ASSISTANT

## 2023-02-07 PROCEDURE — G8417 CALC BMI ABV UP PARAM F/U: HCPCS | Performed by: PHYSICIAN ASSISTANT

## 2023-02-07 PROCEDURE — G8427 DOCREV CUR MEDS BY ELIG CLIN: HCPCS | Performed by: PHYSICIAN ASSISTANT

## 2023-02-07 PROCEDURE — 81025 URINE PREGNANCY TEST: CPT | Performed by: PHYSICIAN ASSISTANT

## 2023-02-07 PROCEDURE — G8484 FLU IMMUNIZE NO ADMIN: HCPCS | Performed by: PHYSICIAN ASSISTANT

## 2023-02-07 PROCEDURE — 4004F PT TOBACCO SCREEN RCVD TLK: CPT | Performed by: PHYSICIAN ASSISTANT

## 2023-02-07 PROCEDURE — 81002 URINALYSIS NONAUTO W/O SCOPE: CPT | Performed by: PHYSICIAN ASSISTANT

## 2023-02-07 RX ORDER — CEFDINIR 300 MG/1
300 CAPSULE ORAL 2 TIMES DAILY
Qty: 14 CAPSULE | Refills: 0 | Status: SHIPPED | OUTPATIENT
Start: 2023-02-07 | End: 2023-02-14

## 2023-02-07 ASSESSMENT — ENCOUNTER SYMPTOMS
NAUSEA: 0
SHORTNESS OF BREATH: 0
PHOTOPHOBIA: 0
BACK PAIN: 0
COUGH: 0
DIARRHEA: 0
ABDOMINAL PAIN: 0
SORE THROAT: 0
VOMITING: 0

## 2023-02-07 NOTE — LETTER
[facility]  [address] [place] [address]  Phone: [phone]  Fax: [address], [place]        February 7, 2023     Patient: Kwaku Spain   YOB: 1990   Date of Visit: 2/7/2023       To Whom it May Concern:    Adelita Galdamez was seen in my clinic on 2/7/2023. She may return to work on 2/8/2023. If you have any questions or concerns, please don't hesitate to call.     Sincerely,         MORGAN GUTHRIE

## 2023-02-07 NOTE — PROGRESS NOTES
23  Paul Spain : 1990 Sex: female  Age 26 y.o. Subjective:  Chief Complaint   Patient presents with    Urinary Tract Infection         78-year-old female with a history of asthma and polycystic ovarian syndrome presents to the walk-in clinic for evaluation of pressure with urination as well as increased frequency. She states she feels like she has to go a lot but then just trickles a small amount. She denies any abdominal pain, back pain or flank pain. No nausea or vomiting. Patient is afebrile. She has been drinking plenty of fluids. She denies any chance of pregnancy. She has the Mirena IUD. Review of Systems   Constitutional:  Negative for chills and fever. HENT:  Negative for congestion, ear pain and sore throat. Eyes:  Negative for photophobia and visual disturbance. Respiratory:  Negative for cough and shortness of breath. Cardiovascular:  Negative for chest pain. Gastrointestinal:  Negative for abdominal pain, diarrhea, nausea and vomiting. Genitourinary:  Positive for dysuria and frequency. Negative for difficulty urinating and urgency. Musculoskeletal:  Negative for back pain, neck pain and neck stiffness. Skin:  Negative for rash. Neurological:  Negative for dizziness, syncope, weakness, light-headedness and headaches. Hematological:  Negative for adenopathy. Does not bruise/bleed easily. Psychiatric/Behavioral:  Negative for agitation and confusion. All other systems reviewed and are negative. PMH:     Past Medical History:   Diagnosis Date    Asthma     PCOS (polycystic ovarian syndrome)        Past Surgical History:   Procedure Laterality Date    BREAST SURGERY      LEG SURGERY      right       History reviewed. No pertinent family history.     Medications:     Current Outpatient Medications:     cefdinir (OMNICEF) 300 MG capsule, Take 1 capsule by mouth 2 times daily for 7 days, Disp: 14 capsule, Rfl: 0    cetirizine-psuedoephedrine (ZYRTEC-D) 5-120 MG per extended release tablet, Take 1 tablet by mouth 2 times daily X 3 days, then as needed, Disp: 60 tablet, Rfl: 0    Multiple Vitamins-Minerals (MULTIVITAMIN WOMEN) TABS, Take by mouth, Disp: , Rfl:     JUNEL FE 1.5/30 1.5-30 MG-MCG tablet, TAKE ONE TABLET BY MOUTH  PER DAY, Disp: , Rfl: 12    methylPREDNISolone (MEDROL DOSEPACK) 4 MG tablet, Take by mouth. (Patient not taking: Reported on 2/7/2023), Disp: 1 kit, Rfl: 0    Allergies: Allergies   Allergen Reactions    Codeine Hives       Social History:     Social History     Tobacco Use    Smoking status: Some Days     Types: Cigarettes    Smokeless tobacco: Never   Substance Use Topics    Alcohol use: No    Drug use: No       Patient lives at home. Physical Exam:     Vitals:    02/07/23 0848   BP: 134/86   Pulse: (!) 102   Resp: 18   Temp: 98.3 °F (36.8 °C)   TempSrc: Temporal   SpO2: 98%   Weight: 191 lb (86.6 kg)   Height: 5' 5\" (1.651 m)       Exam:  Physical Exam  Vitals and nursing note reviewed. Constitutional:       General: She is not in acute distress. Appearance: She is well-developed. HENT:      Head: Normocephalic and atraumatic. Right Ear: Tympanic membrane normal.      Left Ear: Tympanic membrane normal.      Nose: Nose normal.      Mouth/Throat:      Mouth: Mucous membranes are moist.      Pharynx: Oropharynx is clear. Eyes:      Conjunctiva/sclera: Conjunctivae normal.      Pupils: Pupils are equal, round, and reactive to light. Cardiovascular:      Rate and Rhythm: Normal rate and regular rhythm. Pulmonary:      Effort: Pulmonary effort is normal. No respiratory distress. Breath sounds: Normal breath sounds. Abdominal:      General: Bowel sounds are normal. There is no distension. Palpations: Abdomen is soft. Tenderness: There is no abdominal tenderness. There is no right CVA tenderness, left CVA tenderness or guarding. Musculoskeletal:         General: Normal range of motion. Cervical back: Normal range of motion. No rigidity. Lymphadenopathy:      Cervical: No cervical adenopathy. Skin:     General: Skin is warm and dry. Neurological:      General: No focal deficit present. Mental Status: She is alert and oriented to person, place, and time. Mental status is at baseline. Psychiatric:         Mood and Affect: Mood normal.         Behavior: Behavior normal.         Thought Content: Thought content normal.         Judgment: Judgment normal.         Testing:     Results for orders placed or performed in visit on 02/07/23   POCT Urinalysis no Micro   Result Value Ref Range    Color, UA milo     Clarity, UA cloudy     Glucose, UA POC neg     Bilirubin, UA neg     Ketones, UA neg     Spec Grav, UA 1.020     Blood, UA POC large     pH, UA 7.0     Protein, UA  mg/dl     Urobilinogen, UA 0.2 eu/dl     Leukocytes, UA small     Nitrite, UA neg    POCT urine pregnancy   Result Value Ref Range    Preg Test, Ur neg     Control             Medical Decision Making:       Patient upon arrival did not appear toxic or lethargic. Vital signs were reviewed. Past medical history reviewed. Allergies reviewed. Medications reviewed. Patient is presenting with the above complaint of UTI symptoms. Urine dip reveals small leukocytes and large blood. Urine culture is pending. Urine pregnancy is negative. Patient will be empirically treated with cefdinir. She will drink plenty of fluids. She was educated on signs and symptoms that would warrant emergency evaluation in the emergency department. Patient will follow-up with her PCP as needed. She understands the plan and is agreeable. Clinical Impression:   Anuj Lewis was seen today for urinary tract infection. Diagnoses and all orders for this visit:    Dysuria  -     POCT Urinalysis no Micro  -     POCT urine pregnancy  -     Culture, Urine;  Future    Urinary frequency    UTI symptoms    Other orders  -     cefdinir (OMNICEF) 300 MG capsule; Take 1 capsule by mouth 2 times daily for 7 days      The patient is to call for any concerns or return if any of the signs or symptoms worsen. The patient is to follow-up with PCP in the next 2-3 days for repeat evaluation repeat assessment or go directly to the emergency department. SIGNATURE: Jacqueline Bagley PA-C

## 2023-02-09 LAB
ORGANISM: ABNORMAL
URINE CULTURE, ROUTINE: ABNORMAL

## 2023-02-24 ENCOUNTER — OFFICE VISIT (OUTPATIENT)
Dept: FAMILY MEDICINE CLINIC | Age: 33
End: 2023-02-24
Payer: MEDICAID

## 2023-02-24 VITALS
TEMPERATURE: 97.3 F | HEART RATE: 76 BPM | WEIGHT: 191 LBS | BODY MASS INDEX: 31.82 KG/M2 | OXYGEN SATURATION: 100 % | SYSTOLIC BLOOD PRESSURE: 132 MMHG | HEIGHT: 65 IN | DIASTOLIC BLOOD PRESSURE: 86 MMHG | RESPIRATION RATE: 18 BRPM

## 2023-02-24 DIAGNOSIS — K08.89 PAIN, DENTAL: Primary | ICD-10-CM

## 2023-02-24 PROCEDURE — 4004F PT TOBACCO SCREEN RCVD TLK: CPT | Performed by: NURSE PRACTITIONER

## 2023-02-24 PROCEDURE — G8417 CALC BMI ABV UP PARAM F/U: HCPCS | Performed by: NURSE PRACTITIONER

## 2023-02-24 PROCEDURE — G8427 DOCREV CUR MEDS BY ELIG CLIN: HCPCS | Performed by: NURSE PRACTITIONER

## 2023-02-24 PROCEDURE — G8484 FLU IMMUNIZE NO ADMIN: HCPCS | Performed by: NURSE PRACTITIONER

## 2023-02-24 PROCEDURE — 99213 OFFICE O/P EST LOW 20 MIN: CPT | Performed by: NURSE PRACTITIONER

## 2023-02-24 RX ORDER — NAPROXEN 500 MG/1
TABLET ORAL
Qty: 30 TABLET | Refills: 0 | Status: SHIPPED | OUTPATIENT
Start: 2023-02-24

## 2023-02-24 RX ORDER — PENICILLIN V POTASSIUM 500 MG/1
500 TABLET ORAL 4 TIMES DAILY
Qty: 28 TABLET | Refills: 0 | Status: SHIPPED | OUTPATIENT
Start: 2023-02-24 | End: 2023-03-03

## 2023-02-24 NOTE — PROGRESS NOTES
23  Sabra Bryan : 1990 Sex: female  Age 26 y.o. Subjective:  Chief Complaint   Patient presents with    Dental Pain       HPI:   Sabra Bryan , 26 y.o. female presents to the clinic for evaluation of dental pain x 1 day. Pain is located over the right lower jaw line. The patient has taken Tylenol for symptoms. The patient reports unchanged symptoms over time. Pt is able to handle their own secretions and drink fluids without difficulty. Pt denies any facial edema, facial redness, sore throat, and ear pain. The patient also denies headache, fever, chest pain, abdominal pain, shortness of breath, and nausea / vomiting / diarrhea. Onset:       Spontaneous:   yes. Following Trauma:   no.     Previous Caries:   yes. Recent Dental Procedure:   no.       ROS:   Unless otherwise stated in this report the patient's positive and negative responses for review of systems for constitutional, eyes, ENT, cardiovascular, respiratory, gastrointestinal, neurological, , musculoskeletal, and integument systems and related systems to the presenting problem are either stated in the history of present illness or were not pertinent or were negative for the symptoms and/or complaints related to the presenting medical problem. Positives and pertinent negatives as per HPI. All others reviewed and are negative. PMH:     Past Medical History:   Diagnosis Date    Asthma     PCOS (polycystic ovarian syndrome)        Past Surgical History:   Procedure Laterality Date    BREAST SURGERY      LEG SURGERY      right       History reviewed. No pertinent family history.     Medications:     Current Outpatient Medications:     penicillin v potassium (VEETID) 500 MG tablet, Take 1 tablet by mouth 4 times daily for 7 days, Disp: 28 tablet, Rfl: 0    naproxen (NAPROSYN) 500 MG tablet, 1 tablet, 2 times a day as needed for pain, Disp: 30 tablet, Rfl: 0    cetirizine-psuedoephedrine (ZYRTEC-D) 5-120 MG per extended release tablet, Take 1 tablet by mouth 2 times daily X 3 days, then as needed, Disp: 60 tablet, Rfl: 0    Multiple Vitamins-Minerals (MULTIVITAMIN WOMEN) TABS, Take by mouth, Disp: , Rfl:     JUNEL FE 1.5/30 1.5-30 MG-MCG tablet, TAKE ONE TABLET BY MOUTH  PER DAY, Disp: , Rfl: 12    Allergies: Allergies   Allergen Reactions    Codeine Hives       Social History:     Social History     Tobacco Use    Smoking status: Some Days     Types: Cigarettes    Smokeless tobacco: Never   Substance Use Topics    Alcohol use: No    Drug use: No       Physical Exam:     Vitals:    02/24/23 1143   BP: 132/86   Pulse: 76   Resp: 18   Temp: 97.3 °F (36.3 °C)   TempSrc: Temporal   SpO2: 100%   Weight: 191 lb (86.6 kg)   Height: 5' 5\" (1.651 m)       Physical Exam (PE)   Constitutional: Alert, development consistent with age. HENT:      Head: Normocephalic. Right Ear: External ear normal.      Left Ear: External ear normal.      Nose: Normal.      Mouth/Throat:     Mouth: Mucous membranes are moist. Floor of the mouth is soft. No tenderness in the submental or submandibular space. No tongue elevation. Dental:  Tenderness to tapping over #30 with positive decay noted. No trismus present. Mild right lower facial edema. No redness or drooling. Positive caries present. Pharynx: Oropharynx is clear. Eyes: Pupils: Pupils are equal, round, and reactive to light. Neck: Normal ROM. Supple. Cardiovascular: Heart RRR without pathologic murmurs or gallops. Pulmonary: Respiratory effort normal.  Normal breath sounds. Abdomen: Soft, nontender, normal bowel sounds. Skin:  No rashes, erythema or lesions present, unless noted elsewhere. Lymphatics: No lymphangitis or adenopathy noted. Musculoskeletal: General: Normal strength / ROM. Neurological:  Alert and oriented. Motor functions intact.   Psychiatric: Mood and Affect: Mood normal. Behavior: Behavior normal.    Testing:   (All laboratory and radiology results have been personally reviewed by myself)  Labs:  No results found for this visit on 02/24/23. Imaging: All Radiology results interpreted by Radiologist unless otherwise noted. No orders to display       Assessment / Plan:   The patient's vitals, allergies, medications, and past medical history have been reviewed. Gerardo Palumbo was seen today for dental pain. Diagnoses and all orders for this visit:    Pain, dental  -     penicillin v potassium (VEETID) 500 MG tablet; Take 1 tablet by mouth 4 times daily for 7 days  -     naproxen (NAPROSYN) 500 MG tablet; 1 tablet, 2 times a day as needed for pain      - Disposition: Home    - Educational material printed for patient's review and were included in patient instructions. After Visit Summary was given to patient at the end of visit. - The patient is instructed to ice to outer face prn, oral fluids, and rest. Discussed other symptomatic treatments with the patient today. The patient is to follow-up with Dentist or PCP in the next 2-3 days for reevaluation. Red flag symptoms were also discussed with the patient today. If symptoms worsen the patient is to go directly to the emergency department for reevaluation and treatment. Pt verbalizes understanding and is in agreement with plan of care. All questions answered. SIGNATURE: Chucky Dobson, APRN-CNP    *NOTE: This report was transcribed using voice recognition software. Every effort was made to ensure accuracy; however, inadvertent computerized transcription errors may be present.

## 2023-03-21 LAB
ALBUMIN: NORMAL
ALP BLD-CCNC: NORMAL U/L
ALT SERPL-CCNC: NORMAL U/L
ANION GAP SERPL CALCULATED.3IONS-SCNC: NORMAL MMOL/L
AST SERPL-CCNC: NORMAL U/L
BASOPHILS ABSOLUTE: NORMAL
BASOPHILS RELATIVE PERCENT: NORMAL
BILIRUB SERPL-MCNC: NORMAL MG/DL
BUN BLDV-MCNC: NORMAL MG/DL
CALCIUM SERPL-MCNC: NORMAL MG/DL
CHLORIDE BLD-SCNC: NORMAL MMOL/L
CHOLESTEROL, TOTAL: NORMAL
CHOLESTEROL/HDL RATIO: NORMAL
CO2: NORMAL
CREAT SERPL-MCNC: NORMAL MG/DL
EOSINOPHILS ABSOLUTE: NORMAL
EOSINOPHILS RELATIVE PERCENT: NORMAL
ESTIMATED AVERAGE GLUCOSE: NORMAL
GFR, ESTIMATED: NORMAL
GLUCOSE BLD-MCNC: NORMAL MG/DL
HBA1C MFR BLD: 4.8 %
HCT VFR BLD CALC: NORMAL %
HDLC SERPL-MCNC: NORMAL MG/DL
HEMOGLOBIN: NORMAL
LDL CHOLESTEROL: NORMAL
LYMPHOCYTES ABSOLUTE: NORMAL
LYMPHOCYTES RELATIVE PERCENT: NORMAL
MCH RBC QN AUTO: NORMAL PG
MCHC RBC AUTO-ENTMCNC: NORMAL G/DL
MCV RBC AUTO: NORMAL FL
MONOCYTES ABSOLUTE: NORMAL
MONOCYTES RELATIVE PERCENT: NORMAL
NEUTROPHILS ABSOLUTE: NORMAL
NEUTROPHILS RELATIVE PERCENT: NORMAL
NONHDLC SERPL-MCNC: NORMAL MG/DL
PLATELET # BLD: NORMAL 10*3/UL
PMV BLD AUTO: NORMAL FL
POTASSIUM SERPL-SCNC: NORMAL MMOL/L
RBC # BLD: NORMAL 10*6/UL
SODIUM BLD-SCNC: NORMAL MMOL/L
T3 FREE: NORMAL
T3 TOTAL: NORMAL
T4 FREE: NORMAL
TOTAL PROTEIN: NORMAL
TRIGL SERPL-MCNC: NORMAL MG/DL
TSH SERPL DL<=0.05 MIU/L-ACNC: NORMAL M[IU]/L
VITAMIN D 25-HYDROXY: NORMAL
VITAMIN D2, 25 HYDROXY: NORMAL
VITAMIN D3,25 HYDROXY: NORMAL
VLDLC SERPL CALC-MCNC: NORMAL MG/DL
WBC # BLD: NORMAL 10*3/UL

## 2024-05-27 ENCOUNTER — HOSPITAL ENCOUNTER (EMERGENCY)
Age: 34
Discharge: HOME | End: 2024-05-27
Attending: EMERGENCY MEDICINE

## 2024-05-27 ENCOUNTER — APPOINTMENT (OUTPATIENT)
Dept: CT IMAGING | Age: 34
End: 2024-05-27

## 2024-05-27 VITALS
BODY MASS INDEX: 32.99 KG/M2 | RESPIRATION RATE: 16 BRPM | SYSTOLIC BLOOD PRESSURE: 128 MMHG | TEMPERATURE: 97.6 F | OXYGEN SATURATION: 100 % | HEIGHT: 65 IN | WEIGHT: 198 LBS | DIASTOLIC BLOOD PRESSURE: 79 MMHG | HEART RATE: 85 BPM

## 2024-05-27 DIAGNOSIS — R11.2 NAUSEA AND VOMITING, UNSPECIFIED VOMITING TYPE: Primary | ICD-10-CM

## 2024-05-27 DIAGNOSIS — R10.84 GENERALIZED ABDOMINAL PAIN: ICD-10-CM

## 2024-05-27 DIAGNOSIS — K59.00 CONSTIPATION, UNSPECIFIED CONSTIPATION TYPE: ICD-10-CM

## 2024-05-27 LAB
ALBUMIN SERPL-MCNC: 4.5 G/DL (ref 3.5–5.2)
ALP SERPL-CCNC: 64 U/L (ref 35–104)
ALT SERPL-CCNC: 21 U/L (ref 0–32)
ANION GAP SERPL CALCULATED.3IONS-SCNC: 11 MMOL/L (ref 7–16)
AST SERPL-CCNC: 27 U/L (ref 0–31)
BACTERIA URNS QL MICRO: ABNORMAL
BASOPHILS # BLD: 0.05 K/UL (ref 0–0.2)
BASOPHILS NFR BLD: 1 % (ref 0–2)
BILIRUB SERPL-MCNC: 0.5 MG/DL (ref 0–1.2)
BILIRUB UR QL STRIP: NEGATIVE
BUN SERPL-MCNC: 6 MG/DL (ref 6–20)
CALCIUM SERPL-MCNC: 9.2 MG/DL (ref 8.6–10.2)
CHLORIDE SERPL-SCNC: 101 MMOL/L (ref 98–107)
CLARITY UR: CLEAR
CO2 SERPL-SCNC: 27 MMOL/L (ref 22–29)
COLOR UR: YELLOW
CREAT SERPL-MCNC: 0.8 MG/DL (ref 0.5–1)
EOSINOPHIL # BLD: 0.02 K/UL (ref 0.05–0.5)
EOSINOPHILS RELATIVE PERCENT: 0 % (ref 0–6)
ERYTHROCYTE [DISTWIDTH] IN BLOOD BY AUTOMATED COUNT: 11.7 % (ref 11.5–15)
GFR, ESTIMATED: >90 ML/MIN/1.73M2
GLUCOSE SERPL-MCNC: 113 MG/DL (ref 74–99)
GLUCOSE UR STRIP-MCNC: NEGATIVE MG/DL
HCG, URINE, POC: NEGATIVE
HCT VFR BLD AUTO: 42.5 % (ref 34–48)
HGB BLD-MCNC: 14.9 G/DL (ref 11.5–15.5)
HGB UR QL STRIP.AUTO: ABNORMAL
IMM GRANULOCYTES # BLD AUTO: <0.03 K/UL (ref 0–0.58)
IMM GRANULOCYTES NFR BLD: 0 % (ref 0–5)
KETONES UR STRIP-MCNC: NEGATIVE MG/DL
LEUKOCYTE ESTERASE UR QL STRIP: NEGATIVE
LIPASE SERPL-CCNC: 14 U/L (ref 13–60)
LYMPHOCYTES NFR BLD: 1.64 K/UL (ref 1.5–4)
LYMPHOCYTES RELATIVE PERCENT: 20 % (ref 20–42)
Lab: NORMAL
MAGNESIUM SERPL-MCNC: 1.6 MG/DL (ref 1.6–2.6)
MCH RBC QN AUTO: 32 PG (ref 26–35)
MCHC RBC AUTO-ENTMCNC: 35.1 G/DL (ref 32–34.5)
MCV RBC AUTO: 91.4 FL (ref 80–99.9)
MONOCYTES NFR BLD: 0.51 K/UL (ref 0.1–0.95)
MONOCYTES NFR BLD: 6 % (ref 2–12)
NEGATIVE QC PASS/FAIL: NORMAL
NEUTROPHILS NFR BLD: 74 % (ref 43–80)
NEUTS SEG NFR BLD: 6.17 K/UL (ref 1.8–7.3)
NITRITE UR QL STRIP: NEGATIVE
PH UR STRIP: 8.5 [PH] (ref 5–9)
PLATELET # BLD AUTO: 292 K/UL (ref 130–450)
PMV BLD AUTO: 9.9 FL (ref 7–12)
POSITIVE QC PASS/FAIL: NORMAL
POTASSIUM SERPL-SCNC: 3.8 MMOL/L (ref 3.5–5)
PROT SERPL-MCNC: 7.4 G/DL (ref 6.4–8.3)
PROT UR STRIP-MCNC: 30 MG/DL
RBC # BLD AUTO: 4.65 M/UL (ref 3.5–5.5)
RBC #/AREA URNS HPF: ABNORMAL /HPF
SODIUM SERPL-SCNC: 139 MMOL/L (ref 132–146)
SP GR UR STRIP: 1.01 (ref 1–1.03)
UROBILINOGEN UR STRIP-ACNC: 0.2 EU/DL (ref 0–1)
WBC #/AREA URNS HPF: ABNORMAL /HPF
WBC OTHER # BLD: 8.4 K/UL (ref 4.5–11.5)

## 2024-05-27 PROCEDURE — 74176 CT ABD & PELVIS W/O CONTRAST: CPT

## 2024-05-27 PROCEDURE — 2580000003 HC RX 258

## 2024-05-27 PROCEDURE — 81001 URINALYSIS AUTO W/SCOPE: CPT

## 2024-05-27 PROCEDURE — 83690 ASSAY OF LIPASE: CPT

## 2024-05-27 PROCEDURE — 6360000002 HC RX W HCPCS

## 2024-05-27 PROCEDURE — 80053 COMPREHEN METABOLIC PANEL: CPT

## 2024-05-27 PROCEDURE — 85025 COMPLETE CBC W/AUTO DIFF WBC: CPT

## 2024-05-27 PROCEDURE — 93005 ELECTROCARDIOGRAM TRACING: CPT

## 2024-05-27 PROCEDURE — 99284 EMERGENCY DEPT VISIT MOD MDM: CPT

## 2024-05-27 PROCEDURE — 96361 HYDRATE IV INFUSION ADD-ON: CPT

## 2024-05-27 PROCEDURE — 96374 THER/PROPH/DIAG INJ IV PUSH: CPT

## 2024-05-27 PROCEDURE — 83735 ASSAY OF MAGNESIUM: CPT

## 2024-05-27 RX ORDER — 0.9 % SODIUM CHLORIDE 0.9 %
1000 INTRAVENOUS SOLUTION INTRAVENOUS ONCE
Status: COMPLETED | OUTPATIENT
Start: 2024-05-27 | End: 2024-05-27

## 2024-05-27 RX ORDER — ONDANSETRON 4 MG/1
4 TABLET, ORALLY DISINTEGRATING ORAL 3 TIMES DAILY PRN
Qty: 21 TABLET | Refills: 0 | Status: SHIPPED | OUTPATIENT
Start: 2024-05-27

## 2024-05-27 RX ORDER — ONDANSETRON 2 MG/ML
4 INJECTION INTRAMUSCULAR; INTRAVENOUS ONCE
Status: COMPLETED | OUTPATIENT
Start: 2024-05-27 | End: 2024-05-27

## 2024-05-27 RX ORDER — MAGNESIUM CITRATE
150 SOLUTION, ORAL ORAL ONCE
Qty: 150 ML | Refills: 0 | Status: SHIPPED | OUTPATIENT
Start: 2024-05-27 | End: 2024-05-27

## 2024-05-27 RX ADMIN — ONDANSETRON 4 MG: 2 INJECTION INTRAMUSCULAR; INTRAVENOUS at 13:05

## 2024-05-27 RX ADMIN — SODIUM CHLORIDE 1000 ML: 9 INJECTION, SOLUTION INTRAVENOUS at 13:04

## 2024-05-27 ASSESSMENT — ENCOUNTER SYMPTOMS
BLOOD IN STOOL: 0
CONSTIPATION: 1
BACK PAIN: 1
NAUSEA: 1
CHEST TIGHTNESS: 0
DIARRHEA: 0
ABDOMINAL DISTENTION: 1
COUGH: 0
ABDOMINAL PAIN: 1
SHORTNESS OF BREATH: 0
VOMITING: 1

## 2024-05-27 ASSESSMENT — PAIN - FUNCTIONAL ASSESSMENT: PAIN_FUNCTIONAL_ASSESSMENT: 0-10

## 2024-05-27 ASSESSMENT — PAIN DESCRIPTION - LOCATION: LOCATION: ABDOMEN

## 2024-05-27 ASSESSMENT — PAIN DESCRIPTION - DESCRIPTORS: DESCRIPTORS: DISCOMFORT

## 2024-05-27 ASSESSMENT — PAIN SCALES - GENERAL: PAINLEVEL_OUTOF10: 8

## 2024-05-27 ASSESSMENT — PAIN DESCRIPTION - ORIENTATION: ORIENTATION: LOWER

## 2024-05-27 NOTE — ED PROVIDER NOTES
length with them reasons for immediate return here for re evaluation. They will followup with their primary care provider and gastroenterology by calling their office as soon as possible.      --------------------------------- ADDITIONAL PROVIDER NOTES ---------------------------------  At this time the patient is without objective evidence of an acute process requiring hospitalization or inpatient management.  They have remained hemodynamically stable throughout their entire ED visit and are stable for discharge with outpatient follow-up.     The plan has been discussed in detail and they are aware of the specific conditions for emergent return, as well as the importance of follow-up.      New Prescriptions    MAGNESIUM CITRATE (CITROMA) SOLN    Take 150 mLs by mouth once for 1 dose    ONDANSETRON (ZOFRAN-ODT) 4 MG DISINTEGRATING TABLET    Take 1 tablet by mouth 3 times daily as needed for Nausea or Vomiting       Diagnosis:  1. Nausea and vomiting, unspecified vomiting type    2. Generalized abdominal pain    3. Constipation, unspecified constipation type        Disposition:  Patient's disposition: Discharge to home  Patient's condition is stable.

## 2024-05-28 LAB
EKG ATRIAL RATE: 84 BPM
EKG P AXIS: 19 DEGREES
EKG P-R INTERVAL: 120 MS
EKG Q-T INTERVAL: 380 MS
EKG QRS DURATION: 76 MS
EKG QTC CALCULATION (BAZETT): 449 MS
EKG R AXIS: 47 DEGREES
EKG T AXIS: 17 DEGREES
EKG VENTRICULAR RATE: 84 BPM

## 2024-05-28 PROCEDURE — 93010 ELECTROCARDIOGRAM REPORT: CPT | Performed by: INTERNAL MEDICINE

## 2025-03-10 ENCOUNTER — OFFICE VISIT (OUTPATIENT)
Dept: FAMILY MEDICINE CLINIC | Age: 35
End: 2025-03-10
Payer: COMMERCIAL

## 2025-03-10 VITALS
HEART RATE: 108 BPM | HEIGHT: 65 IN | RESPIRATION RATE: 18 BRPM | SYSTOLIC BLOOD PRESSURE: 130 MMHG | BODY MASS INDEX: 32.99 KG/M2 | TEMPERATURE: 97.4 F | WEIGHT: 198 LBS | DIASTOLIC BLOOD PRESSURE: 80 MMHG | OXYGEN SATURATION: 99 %

## 2025-03-10 DIAGNOSIS — J32.9 SINOBRONCHITIS: Primary | ICD-10-CM

## 2025-03-10 DIAGNOSIS — J40 SINOBRONCHITIS: Primary | ICD-10-CM

## 2025-03-10 PROCEDURE — 99213 OFFICE O/P EST LOW 20 MIN: CPT

## 2025-03-10 RX ORDER — CEFDINIR 300 MG/1
300 CAPSULE ORAL EVERY 12 HOURS
Qty: 14 CAPSULE | Refills: 0 | Status: SHIPPED | OUTPATIENT
Start: 2025-03-10 | End: 2025-03-17

## 2025-03-10 RX ORDER — BENZONATATE 200 MG/1
200 CAPSULE ORAL 3 TIMES DAILY PRN
Qty: 30 CAPSULE | Refills: 0 | Status: SHIPPED | OUTPATIENT
Start: 2025-03-10 | End: 2025-03-20

## 2025-03-10 RX ORDER — METHYLPREDNISOLONE 4 MG/1
TABLET ORAL
Qty: 1 KIT | Refills: 0 | Status: SHIPPED | OUTPATIENT
Start: 2025-03-10 | End: 2025-03-16

## 2025-03-10 NOTE — PROGRESS NOTES
March 10, 2025     Rachel Spain 35 y.o. female    : 1990   Chief Complaint:   Facial Pain (X 2 wks, afebrile No N/V/D) and Chest Congestion (Raspy nonproductive )      History of Present Illness   Source of history provided by:  patient.    Rachel Spain is a 35 y.o. old female who presents to walk-in for evaluation of cough x 2 weeks. Associated symptoms include chest congestion, shortness of breath, and sinus pressure.  Since onset symptoms have been worsening.  Patient has had no known Covid 19 exposure.  Patient has not been diagnosed with COVID-19 in the last 90 days.  Has taken OTC Medications at home with some symptomatic relief. Denies any fever, chills, CP, dyspnea, LE edema, abdominal pain, nausea, vomiting, rash, dizziness, or lethargy. Denies any history of asthma, pneumonia, recurrent bronchitis or COPD.  However, asthma is listed in her chart.They have a history of tobacco abuse.        ROS   Past Medical History:   Past Medical History:   Diagnosis Date    Asthma     PCOS (polycystic ovarian syndrome)      Past Surgical History:  has a past surgical history that includes Leg Surgery and Breast surgery.  Social History:  reports that she has been smoking cigarettes. She has never used smokeless tobacco. She reports that she does not drink alcohol and does not use drugs.  Family History: family history is not on file.   Allergies: Codeine    Unless otherwise stated in this report the patient's positive and negative responses for review of systems for constitutional, eyes, ENT, cardiovascular, respiratory, gastrointestinal, neurological, , musculoskeletal, and integument systems and related systems to the presenting problem are either stated in the history of present illness or were not pertinent or were negative for the symptoms and/or complaints related to the presenting medical problem.  Positives and pertinent negatives as per HPI.  All others reviewed and are negative.    Physical

## 2025-04-29 ENCOUNTER — TELEPHONE (OUTPATIENT)
Dept: OBGYN | Age: 35
End: 2025-04-29

## 2025-04-29 NOTE — TELEPHONE ENCOUNTER
This was routed to the wrong department.     Electronically signed by Jyotsna Friedman MA on 4/29/25 at 10:39 AM EDT

## 2025-04-29 NOTE — TELEPHONE ENCOUNTER
NP would like to schedule an appt with Leonor Prakash for an annual with  PAP.  Patient would like the earliest or latest appt in the day.

## 2025-05-05 ENCOUNTER — HOSPITAL ENCOUNTER (OUTPATIENT)
Age: 35
Discharge: HOME | End: 2025-05-05
Payer: COMMERCIAL

## 2025-05-05 ENCOUNTER — TELEPHONE (OUTPATIENT)
Dept: OBGYN | Age: 35
End: 2025-05-05

## 2025-05-05 ENCOUNTER — OFFICE VISIT (OUTPATIENT)
Dept: OBGYN | Age: 35
End: 2025-05-05
Payer: COMMERCIAL

## 2025-05-05 VITALS
TEMPERATURE: 98 F | SYSTOLIC BLOOD PRESSURE: 111 MMHG | DIASTOLIC BLOOD PRESSURE: 71 MMHG | BODY MASS INDEX: 28.46 KG/M2 | OXYGEN SATURATION: 98 % | HEART RATE: 89 BPM | WEIGHT: 171 LBS

## 2025-05-05 DIAGNOSIS — Z72.51 HIGH RISK HETEROSEXUAL BEHAVIOR: ICD-10-CM

## 2025-05-05 DIAGNOSIS — Z01.419 WELL WOMAN EXAM WITH ROUTINE GYNECOLOGICAL EXAM: ICD-10-CM

## 2025-05-05 DIAGNOSIS — E78.2 MIXED HYPERLIPIDEMIA: ICD-10-CM

## 2025-05-05 DIAGNOSIS — Z98.890 HISTORY OF LOOP ELECTROSURGICAL EXCISION PROCEDURE (LEEP): ICD-10-CM

## 2025-05-05 DIAGNOSIS — Z01.419 WELL WOMAN EXAM WITH ROUTINE GYNECOLOGICAL EXAM: Primary | ICD-10-CM

## 2025-05-05 DIAGNOSIS — N89.8 VAGINAL DISCHARGE: ICD-10-CM

## 2025-05-05 DIAGNOSIS — R87.610 ASCUS OF CERVIX WITH NEGATIVE HIGH RISK HPV: ICD-10-CM

## 2025-05-05 LAB
25(OH)D3 SERPL-MCNC: 21.5 NG/ML (ref 30–100)
ALBUMIN SERPL-MCNC: 4.8 G/DL (ref 3.5–5.2)
ALP SERPL-CCNC: 61 U/L (ref 35–104)
ALT SERPL-CCNC: 21 U/L (ref 0–32)
ANION GAP SERPL CALCULATED.3IONS-SCNC: 12 MMOL/L (ref 7–16)
AST SERPL-CCNC: 28 U/L (ref 0–31)
BASOPHILS # BLD: 0.03 K/UL (ref 0–0.2)
BASOPHILS NFR BLD: 0 % (ref 0–2)
BILIRUB SERPL-MCNC: 1 MG/DL (ref 0–1.2)
BUN SERPL-MCNC: 13 MG/DL (ref 6–20)
CALCIUM SERPL-MCNC: 9.5 MG/DL (ref 8.6–10.2)
CHLORIDE SERPL-SCNC: 102 MMOL/L (ref 98–107)
CHOLEST SERPL-MCNC: 194 MG/DL
CO2 SERPL-SCNC: 25 MMOL/L (ref 22–29)
CREAT SERPL-MCNC: 0.9 MG/DL (ref 0.5–1)
CRP SERPL HS-MCNC: 0.4 MG/L (ref 0–3)
EOSINOPHIL # BLD: 0.02 K/UL (ref 0.05–0.5)
EOSINOPHILS RELATIVE PERCENT: 0 % (ref 0–6)
ERYTHROCYTE [DISTWIDTH] IN BLOOD BY AUTOMATED COUNT: 11.9 % (ref 11.5–15)
GFR, ESTIMATED: 89 ML/MIN/1.73M2
GLUCOSE SERPL-MCNC: 90 MG/DL (ref 74–99)
HBA1C MFR BLD: 5 % (ref 4–5.6)
HCT VFR BLD AUTO: 42.1 % (ref 34–48)
HDLC SERPL-MCNC: 72 MG/DL
HGB BLD-MCNC: 14.8 G/DL (ref 11.5–15.5)
IMM GRANULOCYTES # BLD AUTO: 0.03 K/UL (ref 0–0.58)
IMM GRANULOCYTES NFR BLD: 0 % (ref 0–5)
LDLC SERPL CALC-MCNC: 105 MG/DL
LYMPHOCYTES NFR BLD: 1.81 K/UL (ref 1.5–4)
LYMPHOCYTES RELATIVE PERCENT: 21 % (ref 20–42)
MCH RBC QN AUTO: 33.1 PG (ref 26–35)
MCHC RBC AUTO-ENTMCNC: 35.2 G/DL (ref 32–34.5)
MCV RBC AUTO: 94.2 FL (ref 80–99.9)
MONOCYTES NFR BLD: 0.4 K/UL (ref 0.1–0.95)
MONOCYTES NFR BLD: 5 % (ref 2–12)
NEUTROPHILS NFR BLD: 73 % (ref 43–80)
NEUTS SEG NFR BLD: 6.26 K/UL (ref 1.8–7.3)
PLATELET # BLD AUTO: 306 K/UL (ref 130–450)
PMV BLD AUTO: 10.3 FL (ref 7–12)
POTASSIUM SERPL-SCNC: 4.3 MMOL/L (ref 3.5–5)
PROT SERPL-MCNC: 7.8 G/DL (ref 6.4–8.3)
RBC # BLD AUTO: 4.47 M/UL (ref 3.5–5.5)
SODIUM SERPL-SCNC: 139 MMOL/L (ref 132–146)
TRIGL SERPL-MCNC: 84 MG/DL
TSH SERPL DL<=0.05 MIU/L-ACNC: 1.88 UIU/ML (ref 0.27–4.2)
VLDLC SERPL CALC-MCNC: 17 MG/DL
WBC OTHER # BLD: 8.6 K/UL (ref 4.5–11.5)

## 2025-05-05 PROCEDURE — 85025 COMPLETE CBC W/AUTO DIFF WBC: CPT

## 2025-05-05 PROCEDURE — 99213 OFFICE O/P EST LOW 20 MIN: CPT | Performed by: ADVANCED PRACTICE MIDWIFE

## 2025-05-05 PROCEDURE — 80061 LIPID PANEL: CPT

## 2025-05-05 PROCEDURE — 36415 COLL VENOUS BLD VENIPUNCTURE: CPT

## 2025-05-05 PROCEDURE — 83036 HEMOGLOBIN GLYCOSYLATED A1C: CPT

## 2025-05-05 PROCEDURE — 86141 C-REACTIVE PROTEIN HS: CPT

## 2025-05-05 PROCEDURE — 82306 VITAMIN D 25 HYDROXY: CPT

## 2025-05-05 PROCEDURE — 99385 PREV VISIT NEW AGE 18-39: CPT | Performed by: ADVANCED PRACTICE MIDWIFE

## 2025-05-05 PROCEDURE — 80053 COMPREHEN METABOLIC PANEL: CPT

## 2025-05-05 PROCEDURE — 84443 ASSAY THYROID STIM HORMONE: CPT

## 2025-05-05 SDOH — ECONOMIC STABILITY: FOOD INSECURITY: WITHIN THE PAST 12 MONTHS, THE FOOD YOU BOUGHT JUST DIDN'T LAST AND YOU DIDN'T HAVE MONEY TO GET MORE.: NEVER TRUE

## 2025-05-05 SDOH — ECONOMIC STABILITY: FOOD INSECURITY: WITHIN THE PAST 12 MONTHS, YOU WORRIED THAT YOUR FOOD WOULD RUN OUT BEFORE YOU GOT MONEY TO BUY MORE.: NEVER TRUE

## 2025-05-05 ASSESSMENT — PATIENT HEALTH QUESTIONNAIRE - PHQ9
SUM OF ALL RESPONSES TO PHQ QUESTIONS 1-9: 0
1. LITTLE INTEREST OR PLEASURE IN DOING THINGS: NOT AT ALL
SUM OF ALL RESPONSES TO PHQ QUESTIONS 1-9: 0
2. FEELING DOWN, DEPRESSED OR HOPELESS: NOT AT ALL

## 2025-05-05 NOTE — PROGRESS NOTES
Rachel Spain is a 35 y.o. white female, , here for GYN exam.      Current Complaints: Routine Gyn Exam  Patient here for routine exam.  Current Complaints: would liike std testing, found out boyfriend of 8yr cheated on her. Admits to discharge clear, no odor or irritaton.. Personal Health Questionnaire Reviewed: yes     Gynecologic History  Patient's last menstrual period was 2025 (exact date).  Contraception: none  Last Pap: 3/20/2023  Results: abnormal - ASCUS w/ Hpv (-)  Last Mammogram:   Results: abnormal fibroadenoma right breast, removed.    Obstetric History  : 1  Para: 1  AB: 0       Personal Health Questionnaire Reviewed: yes.    Gynecologic History  Patient's last menstrual period was 2025 (exact date).  Contraception: none  Exercise: Yes  OB History          1    Para   1    Term           1    AB        Living   1         SAB        IAB        Ectopic        Molar        Multiple        Live Births   1                  Review of Systems  Constitutional: negative  Eyes: positive for contacts/glasses  Ears, nose, mouth, throat, and face: negative  Respiratory: negative  Cardiovascular: negative  Gastrointestinal: positive for constipation and nausea  Genitourinary:negative  Integument/breast: negative  Hematologic/lymphatic: negative  Musculoskeletal:negative  Neurological: negative  Behavioral/Psych: positive for anxiety  Endocrine: negative  Allergic/Immunologic: positive for codiene - hives/itching     Objective:       /71   Pulse 89   Temp 98 °F (36.7 °C)   Wt 77.6 kg (171 lb)   LMP 2025 (Exact Date)   SpO2 98%   Breastfeeding No   BMI 28.46 kg/m²   General appearance: alert, appears stated age, and cooperative  Head: Normocephalic, without obvious abnormality, atraumatic  Eyes: conjunctivae/corneas clear.  Ears: normal  external ear canals both ears  Neck: no adenopathy, supple, symmetrical, trachea midline, and thyroid not enlarged,

## 2025-05-05 NOTE — PROGRESS NOTES
Here today for her annual exam  Patient has complaints partner cheated, would like std check done today as well  Discharge instructions have been discussed with the patient. Patient advised to call our office with any questions or concerns.   Voiced understanding.  Pap smear and health tracks obtained, labeled and sent to lab

## 2025-05-06 DIAGNOSIS — Z01.419 WELL WOMAN EXAM WITH ROUTINE GYNECOLOGICAL EXAM: ICD-10-CM

## 2025-05-06 DIAGNOSIS — N89.8 VAGINAL DISCHARGE: ICD-10-CM

## 2025-05-06 DIAGNOSIS — Z72.51 HIGH RISK HETEROSEXUAL BEHAVIOR: ICD-10-CM

## 2025-05-09 LAB
GYNECOLOGY CYTOLOGY REPORT: NORMAL
HPV SAMPLE: NORMAL
HPV SOURCE: NORMAL
HPV, GENOTYPE 16: NOT DETECTED
HPV, GENOTYPE 18: NOT DETECTED
HPV, HIGH RISK OTHER: NOT DETECTED
HPV, INTERPRETATION: NORMAL

## 2025-05-30 ENCOUNTER — OFFICE VISIT (OUTPATIENT)
Dept: FAMILY MEDICINE CLINIC | Age: 35
End: 2025-05-30
Payer: COMMERCIAL

## 2025-05-30 VITALS
HEART RATE: 86 BPM | HEIGHT: 65 IN | OXYGEN SATURATION: 99 % | WEIGHT: 173 LBS | TEMPERATURE: 98 F | BODY MASS INDEX: 28.82 KG/M2 | SYSTOLIC BLOOD PRESSURE: 122 MMHG | DIASTOLIC BLOOD PRESSURE: 80 MMHG | RESPIRATION RATE: 18 BRPM

## 2025-05-30 DIAGNOSIS — J32.9 SINOBRONCHITIS: Primary | ICD-10-CM

## 2025-05-30 DIAGNOSIS — J40 SINOBRONCHITIS: Primary | ICD-10-CM

## 2025-05-30 DIAGNOSIS — J45.21 MILD INTERMITTENT ASTHMA WITH EXACERBATION: ICD-10-CM

## 2025-05-30 PROCEDURE — 99214 OFFICE O/P EST MOD 30 MIN: CPT | Performed by: NURSE PRACTITIONER

## 2025-05-30 RX ORDER — PREDNISONE 20 MG/1
20 TABLET ORAL 2 TIMES DAILY
Qty: 10 TABLET | Refills: 0 | Status: SHIPPED | OUTPATIENT
Start: 2025-05-30 | End: 2025-06-04

## 2025-05-30 RX ORDER — BROMPHENIRAMINE MALEATE, PSEUDOEPHEDRINE HYDROCHLORIDE, AND DEXTROMETHORPHAN HYDROBROMIDE 2; 30; 10 MG/5ML; MG/5ML; MG/5ML
SYRUP ORAL
Qty: 160 ML | Refills: 0 | Status: SHIPPED | OUTPATIENT
Start: 2025-05-30

## 2025-05-30 RX ORDER — ALBUTEROL SULFATE 90 UG/1
2 INHALANT RESPIRATORY (INHALATION) EVERY 4 HOURS PRN
Qty: 1 EACH | Refills: 0 | Status: SHIPPED | OUTPATIENT
Start: 2025-05-30

## 2025-05-30 NOTE — PROGRESS NOTES
25  Rachel Spain : 1990 Sex: female  Age 35 y.o.    Subjective:  Chief Complaint   Patient presents with    Cough     X 1 week       HPI:   Rachel Spain , 35 y.o. female presents to the clinic for evaluation of cough x 7 days. The patient also reports chest congestion, sore throat, sinus congestion. The patient has taken Mucinex, Alisia Maskell for symptoms. The patient reports worsening symptoms over time. The patient denies known ill exposure. Denies rash, and fever. Denies chest pain, abdominal pain, shortness of breath, wheezing, and nausea / vomiting / diarrhea.    ROS:   Unless otherwise stated in this report the patient's positive and negative responses for review of systems for constitutional, eyes, ENT, cardiovascular, respiratory, gastrointestinal, neurological, , musculoskeletal, and integument systems and related systems to the presenting problem are either stated in the history of present illness or were not pertinent or were negative for the symptoms and/or complaints related to the presenting medical problem.  Positives and pertinent negatives as per HPI.  All others reviewed and are negative.      PMH:     Past Medical History:   Diagnosis Date    Asthma     PCOS (polycystic ovarian syndrome)        Past Surgical History:   Procedure Laterality Date    BREAST SURGERY      LEG SURGERY      right       Family History   Problem Relation Age of Onset    Hypertension Mother     Diabetes Mother        Medications:     Current Outpatient Medications:     amoxicillin-clavulanate (AUGMENTIN) 875-125 MG per tablet, Take 1 tablet by mouth 2 times daily for 10 days, Disp: 20 tablet, Rfl: 0    predniSONE (DELTASONE) 20 MG tablet, Take 1 tablet by mouth 2 times daily for 5 days, Disp: 10 tablet, Rfl: 0    albuterol sulfate HFA (PROVENTIL;VENTOLIN;PROAIR) 108 (90 Base) MCG/ACT inhaler, Inhale 2 puffs into the lungs every 4 hours as needed for Wheezing or Shortness of Breath, Disp: 1 each, Rfl:

## 2025-06-02 ENCOUNTER — OFFICE VISIT (OUTPATIENT)
Age: 35
End: 2025-06-02
Payer: COMMERCIAL

## 2025-06-02 VITALS
OXYGEN SATURATION: 97 % | HEART RATE: 79 BPM | DIASTOLIC BLOOD PRESSURE: 77 MMHG | WEIGHT: 173 LBS | BODY MASS INDEX: 28.79 KG/M2 | SYSTOLIC BLOOD PRESSURE: 113 MMHG | TEMPERATURE: 98 F

## 2025-06-02 DIAGNOSIS — E78.2 MIXED HYPERLIPIDEMIA: ICD-10-CM

## 2025-06-02 DIAGNOSIS — N76.0 BACTERIAL VAGINOSIS: Primary | ICD-10-CM

## 2025-06-02 DIAGNOSIS — B96.89 BACTERIAL VAGINOSIS: Primary | ICD-10-CM

## 2025-06-02 PROCEDURE — 99213 OFFICE O/P EST LOW 20 MIN: CPT | Performed by: ADVANCED PRACTICE MIDWIFE

## 2025-06-02 RX ORDER — CLINDAMYCIN HYDROCHLORIDE 300 MG/1
300 CAPSULE ORAL 2 TIMES DAILY
Qty: 14 CAPSULE | Refills: 0 | Status: SHIPPED | OUTPATIENT
Start: 2025-06-02 | End: 2025-06-09

## 2025-06-02 NOTE — PROGRESS NOTES
Here today to review results. Blood work, pap smear and health tracks all done on 5/5/2025  Patient has no new complaints  Discharge instructions have been discussed with the patient. Patient advised to call our office with any questions or concerns.   Voiced understanding.

## 2025-06-02 NOTE — PROGRESS NOTES
Rachel Spain (:  1990) is a 35 y.o. female,Established patient, here for evaluation of the following chief complaint(s):  Results    Subjective   HPI:   Dx Friday w/bronchitis, currently taking antibiotics. Otherwise no c/o. Here to review results from annual.   ROS:        Objective   Physical Exam:    /77   Pulse 79   Temp 98 °F (36.7 °C)   Wt 78.5 kg (173 lb)   LMP 05/10/2025 (Exact Date)   SpO2 97%   BMI 28.79 kg/m²             Assessment/Plan:  No diagnosis found.     No follow-ups on file.    No orders of the defined types were placed in this encounter.     An electronic signature was used to authenticate this note.    --Leonor Prakash, [facility]

## 2025-06-27 ENCOUNTER — OFFICE VISIT (OUTPATIENT)
Dept: FAMILY MEDICINE CLINIC | Age: 35
End: 2025-06-27
Payer: COMMERCIAL

## 2025-06-27 VITALS
HEART RATE: 84 BPM | DIASTOLIC BLOOD PRESSURE: 80 MMHG | BODY MASS INDEX: 28.82 KG/M2 | HEIGHT: 65 IN | OXYGEN SATURATION: 99 % | RESPIRATION RATE: 18 BRPM | SYSTOLIC BLOOD PRESSURE: 130 MMHG | TEMPERATURE: 97.7 F | WEIGHT: 173 LBS

## 2025-06-27 DIAGNOSIS — H66.001 NON-RECURRENT ACUTE SUPPURATIVE OTITIS MEDIA OF RIGHT EAR WITHOUT SPONTANEOUS RUPTURE OF TYMPANIC MEMBRANE: Primary | ICD-10-CM

## 2025-06-27 PROCEDURE — 99213 OFFICE O/P EST LOW 20 MIN: CPT | Performed by: NURSE PRACTITIONER

## 2025-06-27 RX ORDER — METHYLPREDNISOLONE 4 MG/1
TABLET ORAL
Qty: 1 KIT | Refills: 0 | Status: SHIPPED | OUTPATIENT
Start: 2025-06-27

## 2025-06-27 RX ORDER — CEFDINIR 300 MG/1
300 CAPSULE ORAL 2 TIMES DAILY
Qty: 20 CAPSULE | Refills: 0 | Status: SHIPPED | OUTPATIENT
Start: 2025-06-27 | End: 2025-07-07

## 2025-06-27 NOTE — PROGRESS NOTES
25  Rachel Spain : 1990 Sex: female  Age 35 y.o.    Subjective:  Chief Complaint   Patient presents with   • Ear Pain       HPI:   Rachel Spain , 35 y.o. female presents to the clinic for evaluation of right ear pain x 4-5 days.  Patient also reports intermittent dizziness when changing positions.  Patient was recently treated on 2025 for sinobronchitis with Augmentin.  Patient reports those symptoms fully resolved.  The patient has not taken any treatment for current symptoms. The patient reports worsening symptoms over time. Denies sinus congestion, sore throat, ear drainage, trauma, and loss of hearing. Denies headache, fever, chest pain, abdominal pain, shortness of breath, and nausea / vomiting / diarrhea.    ROS:   Unless otherwise stated in this report the patient's positive and negative responses for review of systems for constitutional, eyes, ENT, cardiovascular, respiratory, gastrointestinal, neurological, , musculoskeletal, and integument systems and related systems to the presenting problem are either stated in the history of present illness or were not pertinent or were negative for the symptoms and/or complaints related to the presenting medical problem.  Positives and pertinent negatives as per HPI.  All others reviewed and are negative.      PMH:     Past Medical History:   Diagnosis Date   • Asthma    • PCOS (polycystic ovarian syndrome)        Past Surgical History:   Procedure Laterality Date   • BREAST SURGERY     • LEG SURGERY      right       Family History   Problem Relation Age of Onset   • Hypertension Mother    • Diabetes Mother        Medications:     Current Outpatient Medications:   •  cefdinir (OMNICEF) 300 MG capsule, Take 1 capsule by mouth 2 times daily for 10 days, Disp: 20 capsule, Rfl: 0  •  methylPREDNISolone (MEDROL DOSEPACK) 4 MG tablet, Take by mouth., Disp: 1 kit, Rfl: 0  •  Multiple Vitamins-Minerals (MULTIVITAMIN WOMEN) TABS, Take by mouth, Disp: